# Patient Record
Sex: FEMALE | Race: WHITE | NOT HISPANIC OR LATINO
[De-identification: names, ages, dates, MRNs, and addresses within clinical notes are randomized per-mention and may not be internally consistent; named-entity substitution may affect disease eponyms.]

---

## 2021-08-21 ENCOUNTER — TRANSCRIPTION ENCOUNTER (OUTPATIENT)
Age: 86
End: 2021-08-21

## 2021-08-21 ENCOUNTER — INPATIENT (INPATIENT)
Facility: HOSPITAL | Age: 86
LOS: 4 days | Discharge: NURSING, REHAB OR LONG-TERM CARE | DRG: 482 | End: 2021-08-26
Attending: ORTHOPAEDIC SURGERY | Admitting: ORTHOPAEDIC SURGERY
Payer: COMMERCIAL

## 2021-08-21 VITALS
TEMPERATURE: 98 F | DIASTOLIC BLOOD PRESSURE: 96 MMHG | OXYGEN SATURATION: 96 % | HEART RATE: 75 BPM | HEIGHT: 60 IN | WEIGHT: 145.06 LBS | RESPIRATION RATE: 20 BRPM | SYSTOLIC BLOOD PRESSURE: 213 MMHG

## 2021-08-21 PROCEDURE — 99285 EMERGENCY DEPT VISIT HI MDM: CPT | Mod: GC

## 2021-08-22 DIAGNOSIS — R52 PAIN, UNSPECIFIED: ICD-10-CM

## 2021-08-22 DIAGNOSIS — E78.5 HYPERLIPIDEMIA, UNSPECIFIED: ICD-10-CM

## 2021-08-22 DIAGNOSIS — S72.009A FRACTURE OF UNSPECIFIED PART OF NECK OF UNSPECIFIED FEMUR, INITIAL ENCOUNTER FOR CLOSED FRACTURE: ICD-10-CM

## 2021-08-22 DIAGNOSIS — I10 ESSENTIAL (PRIMARY) HYPERTENSION: ICD-10-CM

## 2021-08-22 LAB
ALBUMIN SERPL ELPH-MCNC: 4.3 G/DL — SIGNIFICANT CHANGE UP (ref 3.3–5)
ALP SERPL-CCNC: 82 U/L — SIGNIFICANT CHANGE UP (ref 40–120)
ALT FLD-CCNC: 25 U/L — SIGNIFICANT CHANGE UP (ref 10–45)
ANION GAP SERPL CALC-SCNC: 12 MMOL/L — SIGNIFICANT CHANGE UP (ref 5–17)
ANION GAP SERPL CALC-SCNC: 12 MMOL/L — SIGNIFICANT CHANGE UP (ref 5–17)
APTT BLD: 26.2 SEC — LOW (ref 27.5–35.5)
AST SERPL-CCNC: 19 U/L — SIGNIFICANT CHANGE UP (ref 10–40)
BASOPHILS # BLD AUTO: 0.05 K/UL — SIGNIFICANT CHANGE UP (ref 0–0.2)
BASOPHILS # BLD AUTO: 0.05 K/UL — SIGNIFICANT CHANGE UP (ref 0–0.2)
BASOPHILS NFR BLD AUTO: 0.3 % — SIGNIFICANT CHANGE UP (ref 0–2)
BASOPHILS NFR BLD AUTO: 0.3 % — SIGNIFICANT CHANGE UP (ref 0–2)
BILIRUB SERPL-MCNC: 0.2 MG/DL — SIGNIFICANT CHANGE UP (ref 0.2–1.2)
BLD GP AB SCN SERPL QL: NEGATIVE — SIGNIFICANT CHANGE UP
BUN SERPL-MCNC: 21 MG/DL — SIGNIFICANT CHANGE UP (ref 7–23)
BUN SERPL-MCNC: 30 MG/DL — HIGH (ref 7–23)
CALCIUM SERPL-MCNC: 10.1 MG/DL — SIGNIFICANT CHANGE UP (ref 8.4–10.5)
CALCIUM SERPL-MCNC: 9.2 MG/DL — SIGNIFICANT CHANGE UP (ref 8.4–10.5)
CHLORIDE SERPL-SCNC: 105 MMOL/L — SIGNIFICANT CHANGE UP (ref 96–108)
CHLORIDE SERPL-SCNC: 106 MMOL/L — SIGNIFICANT CHANGE UP (ref 96–108)
CO2 SERPL-SCNC: 22 MMOL/L — SIGNIFICANT CHANGE UP (ref 22–31)
CO2 SERPL-SCNC: 23 MMOL/L — SIGNIFICANT CHANGE UP (ref 22–31)
CREAT SERPL-MCNC: 0.61 MG/DL — SIGNIFICANT CHANGE UP (ref 0.5–1.3)
CREAT SERPL-MCNC: 0.67 MG/DL — SIGNIFICANT CHANGE UP (ref 0.5–1.3)
EOSINOPHIL # BLD AUTO: 0.01 K/UL — SIGNIFICANT CHANGE UP (ref 0–0.5)
EOSINOPHIL # BLD AUTO: 0.07 K/UL — SIGNIFICANT CHANGE UP (ref 0–0.5)
EOSINOPHIL NFR BLD AUTO: 0.1 % — SIGNIFICANT CHANGE UP (ref 0–6)
EOSINOPHIL NFR BLD AUTO: 0.4 % — SIGNIFICANT CHANGE UP (ref 0–6)
GLUCOSE SERPL-MCNC: 159 MG/DL — HIGH (ref 70–99)
GLUCOSE SERPL-MCNC: 197 MG/DL — HIGH (ref 70–99)
HCT VFR BLD CALC: 38.8 % — SIGNIFICANT CHANGE UP (ref 34.5–45)
HCT VFR BLD CALC: 42.7 % — SIGNIFICANT CHANGE UP (ref 34.5–45)
HGB BLD-MCNC: 12.7 G/DL — SIGNIFICANT CHANGE UP (ref 11.5–15.5)
HGB BLD-MCNC: 14.2 G/DL — SIGNIFICANT CHANGE UP (ref 11.5–15.5)
IMM GRANULOCYTES NFR BLD AUTO: 0.6 % — SIGNIFICANT CHANGE UP (ref 0–1.5)
IMM GRANULOCYTES NFR BLD AUTO: 0.6 % — SIGNIFICANT CHANGE UP (ref 0–1.5)
INR BLD: 0.94 RATIO — SIGNIFICANT CHANGE UP (ref 0.88–1.16)
LYMPHOCYTES # BLD AUTO: 1.31 K/UL — SIGNIFICANT CHANGE UP (ref 1–3.3)
LYMPHOCYTES # BLD AUTO: 1.86 K/UL — SIGNIFICANT CHANGE UP (ref 1–3.3)
LYMPHOCYTES # BLD AUTO: 11.7 % — LOW (ref 13–44)
LYMPHOCYTES # BLD AUTO: 7.6 % — LOW (ref 13–44)
MCHC RBC-ENTMCNC: 30.3 PG — SIGNIFICANT CHANGE UP (ref 27–34)
MCHC RBC-ENTMCNC: 30.5 PG — SIGNIFICANT CHANGE UP (ref 27–34)
MCHC RBC-ENTMCNC: 32.7 GM/DL — SIGNIFICANT CHANGE UP (ref 32–36)
MCHC RBC-ENTMCNC: 33.3 GM/DL — SIGNIFICANT CHANGE UP (ref 32–36)
MCV RBC AUTO: 91.2 FL — SIGNIFICANT CHANGE UP (ref 80–100)
MCV RBC AUTO: 93.3 FL — SIGNIFICANT CHANGE UP (ref 80–100)
MONOCYTES # BLD AUTO: 0.56 K/UL — SIGNIFICANT CHANGE UP (ref 0–0.9)
MONOCYTES # BLD AUTO: 0.82 K/UL — SIGNIFICANT CHANGE UP (ref 0–0.9)
MONOCYTES NFR BLD AUTO: 3.2 % — SIGNIFICANT CHANGE UP (ref 2–14)
MONOCYTES NFR BLD AUTO: 5.2 % — SIGNIFICANT CHANGE UP (ref 2–14)
NEUTROPHILS # BLD AUTO: 13.01 K/UL — HIGH (ref 1.8–7.4)
NEUTROPHILS # BLD AUTO: 15.29 K/UL — HIGH (ref 1.8–7.4)
NEUTROPHILS NFR BLD AUTO: 81.8 % — HIGH (ref 43–77)
NEUTROPHILS NFR BLD AUTO: 88.2 % — HIGH (ref 43–77)
NRBC # BLD: 0 /100 WBCS — SIGNIFICANT CHANGE UP (ref 0–0)
NRBC # BLD: 0 /100 WBCS — SIGNIFICANT CHANGE UP (ref 0–0)
PLATELET # BLD AUTO: 229 K/UL — SIGNIFICANT CHANGE UP (ref 150–400)
PLATELET # BLD AUTO: 245 K/UL — SIGNIFICANT CHANGE UP (ref 150–400)
POTASSIUM SERPL-MCNC: 3.9 MMOL/L — SIGNIFICANT CHANGE UP (ref 3.5–5.3)
POTASSIUM SERPL-MCNC: 4.3 MMOL/L — SIGNIFICANT CHANGE UP (ref 3.5–5.3)
POTASSIUM SERPL-SCNC: 3.9 MMOL/L — SIGNIFICANT CHANGE UP (ref 3.5–5.3)
POTASSIUM SERPL-SCNC: 4.3 MMOL/L — SIGNIFICANT CHANGE UP (ref 3.5–5.3)
PROT SERPL-MCNC: 7.1 G/DL — SIGNIFICANT CHANGE UP (ref 6–8.3)
PROTHROM AB SERPL-ACNC: 11.3 SEC — SIGNIFICANT CHANGE UP (ref 10.6–13.6)
RBC # BLD: 4.16 M/UL — SIGNIFICANT CHANGE UP (ref 3.8–5.2)
RBC # BLD: 4.68 M/UL — SIGNIFICANT CHANGE UP (ref 3.8–5.2)
RBC # FLD: 13.3 % — SIGNIFICANT CHANGE UP (ref 10.3–14.5)
RBC # FLD: 13.6 % — SIGNIFICANT CHANGE UP (ref 10.3–14.5)
RH IG SCN BLD-IMP: POSITIVE — SIGNIFICANT CHANGE UP
SARS-COV-2 RNA SPEC QL NAA+PROBE: SIGNIFICANT CHANGE UP
SODIUM SERPL-SCNC: 140 MMOL/L — SIGNIFICANT CHANGE UP (ref 135–145)
SODIUM SERPL-SCNC: 140 MMOL/L — SIGNIFICANT CHANGE UP (ref 135–145)
WBC # BLD: 15.9 K/UL — HIGH (ref 3.8–10.5)
WBC # BLD: 17.33 K/UL — HIGH (ref 3.8–10.5)
WBC # FLD AUTO: 15.9 K/UL — HIGH (ref 3.8–10.5)
WBC # FLD AUTO: 17.33 K/UL — HIGH (ref 3.8–10.5)

## 2021-08-22 PROCEDURE — 73501 X-RAY EXAM HIP UNI 1 VIEW: CPT | Mod: 26,LT

## 2021-08-22 PROCEDURE — 93010 ELECTROCARDIOGRAM REPORT: CPT

## 2021-08-22 PROCEDURE — 71045 X-RAY EXAM CHEST 1 VIEW: CPT | Mod: 26

## 2021-08-22 PROCEDURE — 73552 X-RAY EXAM OF FEMUR 2/>: CPT | Mod: 26,LT,77

## 2021-08-22 PROCEDURE — 73502 X-RAY EXAM HIP UNI 2-3 VIEWS: CPT | Mod: 26,LT

## 2021-08-22 PROCEDURE — 73552 X-RAY EXAM OF FEMUR 2/>: CPT | Mod: 26,LT

## 2021-08-22 RX ORDER — OXYCODONE HYDROCHLORIDE 5 MG/1
5 TABLET ORAL EVERY 4 HOURS
Refills: 0 | Status: DISCONTINUED | OUTPATIENT
Start: 2021-08-22 | End: 2021-08-22

## 2021-08-22 RX ORDER — FAMOTIDINE 10 MG/ML
20 INJECTION INTRAVENOUS ONCE
Refills: 0 | Status: COMPLETED | OUTPATIENT
Start: 2021-08-22 | End: 2021-08-22

## 2021-08-22 RX ORDER — SODIUM CHLORIDE 9 MG/ML
1000 INJECTION INTRAMUSCULAR; INTRAVENOUS; SUBCUTANEOUS
Refills: 0 | Status: DISCONTINUED | OUTPATIENT
Start: 2021-08-22 | End: 2021-08-23

## 2021-08-22 RX ORDER — ACETAMINOPHEN 500 MG
975 TABLET ORAL EVERY 8 HOURS
Refills: 0 | Status: DISCONTINUED | OUTPATIENT
Start: 2021-08-22 | End: 2021-08-26

## 2021-08-22 RX ORDER — TRAMADOL HYDROCHLORIDE 50 MG/1
25 TABLET ORAL EVERY 4 HOURS
Refills: 0 | Status: DISCONTINUED | OUTPATIENT
Start: 2021-08-22 | End: 2021-08-26

## 2021-08-22 RX ORDER — CEFAZOLIN SODIUM 1 G
2000 VIAL (EA) INJECTION EVERY 8 HOURS
Refills: 0 | Status: COMPLETED | OUTPATIENT
Start: 2021-08-22 | End: 2021-08-23

## 2021-08-22 RX ORDER — FOLIC ACID 0.8 MG
1 TABLET ORAL DAILY
Refills: 0 | Status: DISCONTINUED | OUTPATIENT
Start: 2021-08-22 | End: 2021-08-26

## 2021-08-22 RX ORDER — OXYCODONE HYDROCHLORIDE 5 MG/1
2.5 TABLET ORAL EVERY 4 HOURS
Refills: 0 | Status: DISCONTINUED | OUTPATIENT
Start: 2021-08-22 | End: 2021-08-22

## 2021-08-22 RX ORDER — ONDANSETRON 8 MG/1
4 TABLET, FILM COATED ORAL EVERY 6 HOURS
Refills: 0 | Status: DISCONTINUED | OUTPATIENT
Start: 2021-08-22 | End: 2021-08-22

## 2021-08-22 RX ORDER — SENNA PLUS 8.6 MG/1
2 TABLET ORAL AT BEDTIME
Refills: 0 | Status: DISCONTINUED | OUTPATIENT
Start: 2021-08-22 | End: 2021-08-22

## 2021-08-22 RX ORDER — ROSUVASTATIN CALCIUM 5 MG/1
1 TABLET ORAL
Qty: 0 | Refills: 0 | DISCHARGE

## 2021-08-22 RX ORDER — BENZOCAINE AND MENTHOL 5; 1 G/100ML; G/100ML
1 LIQUID ORAL
Refills: 0 | Status: DISCONTINUED | OUTPATIENT
Start: 2021-08-22 | End: 2021-08-26

## 2021-08-22 RX ORDER — ACETAMINOPHEN 500 MG
625 TABLET ORAL EVERY 6 HOURS
Refills: 0 | Status: DISCONTINUED | OUTPATIENT
Start: 2021-08-22 | End: 2021-08-22

## 2021-08-22 RX ORDER — OXYCODONE HYDROCHLORIDE 5 MG/1
2.5 TABLET ORAL EVERY 4 HOURS
Refills: 0 | Status: DISCONTINUED | OUTPATIENT
Start: 2021-08-22 | End: 2021-08-26

## 2021-08-22 RX ORDER — ACETAMINOPHEN 500 MG
1000 TABLET ORAL ONCE
Refills: 0 | Status: COMPLETED | OUTPATIENT
Start: 2021-08-22 | End: 2021-08-22

## 2021-08-22 RX ORDER — AMLODIPINE BESYLATE 2.5 MG/1
2.5 TABLET ORAL ONCE
Refills: 0 | Status: COMPLETED | OUTPATIENT
Start: 2021-08-22 | End: 2021-08-22

## 2021-08-22 RX ORDER — MORPHINE SULFATE 50 MG/1
2 CAPSULE, EXTENDED RELEASE ORAL ONCE
Refills: 0 | Status: DISCONTINUED | OUTPATIENT
Start: 2021-08-22 | End: 2021-08-22

## 2021-08-22 RX ORDER — MAGNESIUM HYDROXIDE 400 MG/1
30 TABLET, CHEWABLE ORAL DAILY
Refills: 0 | Status: DISCONTINUED | OUTPATIENT
Start: 2021-08-22 | End: 2021-08-22

## 2021-08-22 RX ORDER — ATORVASTATIN CALCIUM 80 MG/1
20 TABLET, FILM COATED ORAL AT BEDTIME
Refills: 0 | Status: DISCONTINUED | OUTPATIENT
Start: 2021-08-22 | End: 2021-08-24

## 2021-08-22 RX ORDER — SODIUM CHLORIDE 9 MG/ML
1000 INJECTION INTRAMUSCULAR; INTRAVENOUS; SUBCUTANEOUS
Refills: 0 | Status: DISCONTINUED | OUTPATIENT
Start: 2021-08-22 | End: 2021-08-22

## 2021-08-22 RX ORDER — MAGNESIUM HYDROXIDE 400 MG/1
30 TABLET, CHEWABLE ORAL DAILY
Refills: 0 | Status: DISCONTINUED | OUTPATIENT
Start: 2021-08-22 | End: 2021-08-26

## 2021-08-22 RX ORDER — ONDANSETRON 8 MG/1
4 TABLET, FILM COATED ORAL ONCE
Refills: 0 | Status: COMPLETED | OUTPATIENT
Start: 2021-08-22 | End: 2021-08-22

## 2021-08-22 RX ORDER — ATORVASTATIN CALCIUM 80 MG/1
20 TABLET, FILM COATED ORAL AT BEDTIME
Refills: 0 | Status: DISCONTINUED | OUTPATIENT
Start: 2021-08-22 | End: 2021-08-22

## 2021-08-22 RX ORDER — RIVAROXABAN 15 MG-20MG
10 KIT ORAL DAILY
Refills: 0 | Status: DISCONTINUED | OUTPATIENT
Start: 2021-08-23 | End: 2021-08-26

## 2021-08-22 RX ORDER — FENTANYL CITRATE 50 UG/ML
25 INJECTION INTRAVENOUS
Refills: 0 | Status: DISCONTINUED | OUTPATIENT
Start: 2021-08-22 | End: 2021-08-22

## 2021-08-22 RX ADMIN — Medication 400 MILLIGRAM(S): at 13:35

## 2021-08-22 RX ADMIN — ONDANSETRON 4 MILLIGRAM(S): 8 TABLET, FILM COATED ORAL at 17:19

## 2021-08-22 RX ADMIN — Medication 975 MILLIGRAM(S): at 23:20

## 2021-08-22 RX ADMIN — Medication 1000 MILLIGRAM(S): at 13:50

## 2021-08-22 RX ADMIN — Medication 400 MILLIGRAM(S): at 06:24

## 2021-08-22 RX ADMIN — Medication 1000 MILLIGRAM(S): at 06:54

## 2021-08-22 RX ADMIN — AMLODIPINE BESYLATE 2.5 MILLIGRAM(S): 2.5 TABLET ORAL at 12:53

## 2021-08-22 RX ADMIN — OXYCODONE HYDROCHLORIDE 5 MILLIGRAM(S): 5 TABLET ORAL at 06:24

## 2021-08-22 RX ADMIN — BENZOCAINE AND MENTHOL 1 LOZENGE: 5; 1 LIQUID ORAL at 22:21

## 2021-08-22 RX ADMIN — MORPHINE SULFATE 2 MILLIGRAM(S): 50 CAPSULE, EXTENDED RELEASE ORAL at 03:41

## 2021-08-22 RX ADMIN — MORPHINE SULFATE 2 MILLIGRAM(S): 50 CAPSULE, EXTENDED RELEASE ORAL at 01:26

## 2021-08-22 RX ADMIN — FAMOTIDINE 20 MILLIGRAM(S): 10 INJECTION INTRAVENOUS at 08:51

## 2021-08-22 RX ADMIN — OXYCODONE HYDROCHLORIDE 5 MILLIGRAM(S): 5 TABLET ORAL at 07:24

## 2021-08-22 RX ADMIN — Medication 975 MILLIGRAM(S): at 22:18

## 2021-08-22 RX ADMIN — Medication 100 MILLIGRAM(S): at 22:17

## 2021-08-22 NOTE — ED PROVIDER NOTE - CARE PLAN
1 Principal Discharge DX:	Hip fracture   Principal Discharge DX:	Hip fracture  Goal:	acute, intertrochanteric fracture of the left hip with minimal apex anterior angulation

## 2021-08-22 NOTE — CONSULT NOTE ADULT - PROBLEM SELECTOR RECOMMENDATION 2
Patient has been treating HTN with diet  will continue to monitor BP and adjust meds asneeded  Low sodium diet

## 2021-08-22 NOTE — CONSULT NOTE ADULT - PROBLEM SELECTOR RECOMMENDATION 9
Patient scheduled for an Open reduction and internal fixation of the left hip  No contraindication to scheduled procedure  NPO for procedure   DVT and GI prophylaxis

## 2021-08-22 NOTE — PROGRESS NOTE ADULT - SUBJECTIVE AND OBJECTIVE BOX
Post op Day [ ]  Hospital Day [1 ]     Patient resting without complaints.    No acute event O/N  Waiting for surgery  No chest pain, SOB, N/V.    T(C): 36.9 (08-22-21 @ 06:32), Max: 37.1 (08-22-21 @ 01:01)  HR: 78 (08-22-21 @ 06:32) (75 - 90)  BP: 189/85 (08-22-21 @ 06:32) (162/72 - 213/96)  RR: 18 (08-22-21 @ 06:32) (18 - 20)  SpO2: 98% (08-22-21 @ 06:32) (95% - 98%)  Wt(kg): --    Exam:   Alert / oriented / conversive-  EXT:   LLE         (+) shortening / Ext Rotation           digits slightly cool           n/v/i  No significant changes            1+ DP pulses                                                 14.2   15.90 )-----------( 245      ( 22 Aug 2021 01:44 )             42.7    08-22    140  |  105  |  30<H>  ----------------------------<  197<H>  3.9   |  23  |  0.67    Ca    10.1      22 Aug 2021 01:44    TPro  7.1  /  Alb  4.3  /  TBili  0.2  /  DBili  x   /  AST  19  /  ALT  25  /  AlkPhos  82  08-22  PT/INR - ( 22 Aug 2021 01:44 )   PT: 11.3 sec;   INR: 0.94 ratio         PTT - ( 22 Aug 2021 01:44 )  PTT:26.2 sec

## 2021-08-22 NOTE — BRIEF OPERATIVE NOTE - NSICDXBRIEFPOSTOP_GEN_ALL_CORE_FT
POST-OP DIAGNOSIS:  Closed intertrochanteric fracture of femur 22-Aug-2021 16:32:19  Mitchell Iglesias

## 2021-08-22 NOTE — H&P ADULT - NSHPPHYSICALEXAM_GEN_ALL_CORE
T(C): 36.8 (08-22-21 @ 02:14), Max: 37.1 (08-22-21 @ 01:01)  HR: 81 (08-22-21 @ 03:00) (75 - 90)  BP: 172/74 (08-22-21 @ 03:00) (172/74 - 213/96)  RR: 18 (08-22-21 @ 03:00) (18 - 20)  SpO2: 96% (08-22-21 @ 03:00) (95% - 98%)  Wt(kg): --    PE   LLE:  Skin intact; No ecchymosis/soft tissue swelling  Compartments soft; + TTP about hip. No TTP to knee/leg/ankle/foot   ROM limited 2/2 pain   Unable to SLR; + Log Roll/Heel Strike  Motor intact GS/TA/FHL/EHL  SILT L2-S1  DP/PT pulses 2+    RLE/BUE:   No bony TTP; Good ROM w/o pain; Exam Unremarkable

## 2021-08-22 NOTE — ED PROVIDER NOTE - PHYSICAL EXAMINATION
GENERAL: Awake, alert, NAD  HEENT: NC/AT, moist mucous membranes  LUNGS: CTAB, no wheezes or crackles   CARDIAC: RRR, no m/r/g  ABDOMEN: Soft, normal BS, non tender, non distended, no rebound, no guarding  EXT: +tenderness overlying L lateral thigh, unable to move LLE, leg mildly externally rotated  NEURO: A&Ox3. Moving all extremities.  SKIN: Warm and dry. No rash.  PSYCH: Normal affect.

## 2021-08-22 NOTE — ED PROVIDER NOTE - ATTENDING CONTRIBUTION TO CARE
Attending MD Swift: I personally have seen and examined this patient.  Resident note reviewed and agree on plan of care and except where noted.  See below for details.     seen in [facility]  Accompanied by Jerry Kidd [phone] (requested to be the Emergency Contact)    89F with PMH/PSH including HTN on OTC "herbal medication", HLD on Rosuvastatin, glaucoma on Combigan presents to the ED with L hip pain after fall while dancing at a wedding.  Reports that she tripped on her own feet and landed on her L thigh.  Reports was unable to ambulate after fall.  Denies any other pain or injury including denies chest pain, shortness of breath, palpitations. Denies abdominal pain, nausea, vomiting, diarrhea, bloody or black stools. Denies dysuria, hematuria, change in urinary habits including frequency, urgency. Denies recent illness.  A ten (10) point review of systems was negative other than as stated in the HPI or elsewhere in the chart.     Exam:   General: NAD  HENT: head NCAT, airway patent  Eyes: PERRL  Lungs: lungs CTAB with good inspiratory effort, no wheezing, no rhonchi, no rales  Cardiac: +S1S2, no m/r/g  GI: abdomen soft with +BS, NT, ND  : no CVAT  MSK: FROM at neck, +tenderness to the L hip/thigh, not ranging LLE secondary to pain  Neuro: moving all extremities spontaneously  Psych: normal mood and affect    A/P: 89F with suspected L hip bony injury, will obtain XR    TO BE COMPLETED Attending MD Swift: I personally have seen and examined this patient.  Resident note reviewed and agree on plan of care and except where noted.  See below for details.     seen in [place] 11  Accompanied by Jerry Kidd [phone] (requested to be the Emergency Contact)    89F with PMH/PSH including HTN on OTC "herbal medication", HLD on Rosuvastatin, glaucoma on Combigan presents to the ED with L hip pain after fall while dancing at a wedding.  Reports that she tripped on her own feet and landed on her L thigh.  Reports was unable to ambulate after fall.  Denies any other pain or injury including denies chest pain, shortness of breath, palpitations. Denies abdominal pain, nausea, vomiting, diarrhea, bloody or black stools. Denies dysuria, hematuria, change in urinary habits including frequency, urgency. Denies recent illness.  A ten (10) point review of systems was negative other than as stated in the HPI or elsewhere in the chart.     Exam:   General: NAD  HENT: head NCAT, airway patent  Eyes: PERRL  Lungs: lungs CTAB with good inspiratory effort, no wheezing, no rhonchi, no rales  Cardiac: +S1S2, no m/r/g  GI: abdomen soft with +BS, NT, ND  : no CVAT  MSK: FROM at neck, +tenderness to the L hip/thigh, not ranging LLE secondary to pain, LLE foreshortened and externally rotated, +2 DPs  Neuro: moving all extremities spontaneously except for LLE, limited secondary to pain, sensory grossly intact  Psych: normal mood and affect    A/P: 89F with suspected L hip bony injury, will obtain XR L hip w pelvis, L femur, preop labs, CXR, EKG, pain control

## 2021-08-22 NOTE — ED PROVIDER NOTE - PROGRESS NOTE DETAILS
XR with L hip fracture, patient evaluated by ortho, will admit to their service. Papito Cody, DO PGY3

## 2021-08-22 NOTE — ED PROVIDER NOTE - OBJECTIVE STATEMENT
89 y/o F with hx of HTN, HLD, not on blood thinners, presenting to the ED s/p fall c/o left thigh pain. Patient was dancing when she tripped over her feet and landed on her left thigh. She was unable to get off the floor or walk after the injury. Currently reports left thigh pain. No chest pain, shortness of breath, nausea, vomiting. No head injury or LOC.

## 2021-08-22 NOTE — PRE-ANESTHESIA EVALUATION ADULT - NSANTHOSAYNRD_GEN_A_CORE
No. ESAU screening performed.  STOP BANG Legend: 0-2 = LOW Risk; 3-4 = INTERMEDIATE Risk; 5-8 = HIGH Risk

## 2021-08-22 NOTE — ED PROVIDER NOTE - CLINICAL SUMMARY MEDICAL DECISION MAKING FREE TEXT BOX
89 y/o F with PMH HTN, HLD presenting to the ED s/p slip and fall c/o left thigh pain. Patient hypertensive, vitals otherwise stable. Exam with +left lateral hip pain, patient unable to move leg off stretcher. Will get preop labs, XR L hip to evaluate for fx. Papito Cody, DO PGY3

## 2021-08-22 NOTE — H&P ADULT - HISTORY OF PRESENT ILLNESS
89y Female PMH HLD presents s/p mechanical fall c/o severe L hip pain and inability to ambulate. States she was dancing at a wedding yesterday evening when she lost her balance, falling on her left hip. Patient denies headstrike or LOC. Patient denies radiation of pain. Patient denies numbness/tingling/burning in the LLE. No other bone/joint complaints. Patient is a community ambulator at baseline without assistive devices.

## 2021-08-22 NOTE — CONSULT NOTE ADULT - SUBJECTIVE AND OBJECTIVE BOX
89 y/o F with hx of HTN, HLD, not on blood thinners, presenting to the ED s/p fall c/o left thigh pain. Patient was dancing when she tripped over her feet and landed on her left thigh. She was unable to get off the floor or walk after the injury. Currently reports left thigh pain. No chest pain, shortness of breath, nausea, vomiting. No head injury or LOC. Patient was brought to [facility] where she was found to have a left hip fx. Patient is scheduled for an Open reduction and internal fixation of the left hip. Patient seen ow resting comfortably.       PAST MEDICAL & SURGICAL HISTORY:  HTN    Glaucoma    HDL        MEDICATIONS  (STANDING):  atorvastatin 20 milliGRAM(s) Oral at bedtime  famotidine Injectable 20 milliGRAM(s) IV Push once  senna 2 Tablet(s) Oral at bedtime  sodium chloride 0.9%. 1000 milliLiter(s) (85 mL/Hr) IV Continuous <Continuous>    MEDICATIONS  (PRN):  acetaminophen  IVPB .. 1000 milliGRAM(s) IV Intermittent once PRN Severe Pain (7 - 10)  magnesium hydroxide Suspension 30 milliLiter(s) Oral daily PRN Constipation  ondansetron Injectable 4 milliGRAM(s) IV Push every 6 hours PRN Nausea and/or Vomiting  oxyCODONE    IR 2.5 milliGRAM(s) Oral every 4 hours PRN Moderate Pain (4 - 6)  oxyCODONE    IR 5 milliGRAM(s) Oral every 4 hours PRN Severe Pain (7 - 10)      ROS  CONSTITUTIONAL: No weakness, fevers or chills  EYES/ENT: No visual changes;  No vertigo or throat pain   NECK: No pain or stiffness  RESPIRATORY: No cough, wheezing, hemoptysis; No shortness of breath  CARDIOVASCULAR: No chest pain or palpitations  GASTROINTESTINAL: No abdominal or epigastric pain. No nausea, vomiting, or hematemesis; No diarrhea or constipation. No melena or hematochezia.  GENITOURINARY: No dysuria, frequency or hematuria  NEUROLOGICAL: No numbness or weakness  SKIN: No itching, burning, rashes, or lesions   MUSCULOSKELETAL: Left hip pain    INTERVAL HPI/OVERNIGHT EVENTS:  T(C): 36.9 (08-22-21 @ 06:32), Max: 37.1 (08-22-21 @ 01:01)  HR: 78 (08-22-21 @ 06:32) (75 - 90)  BP: 189/85 (08-22-21 @ 06:32) (162/72 - 213/96)  RR: 18 (08-22-21 @ 06:32) (18 - 20)  SpO2: 98% (08-22-21 @ 06:32) (95% - 98%)  Wt(kg): --  I&O's Summary    21 Aug 2021 07:01  -  22 Aug 2021 07:00  --------------------------------------------------------  IN: 350 mL / OUT: 0 mL / NET: 350 mL        PHYSICAL EXAM:  GENERAL: NAD, well-groomed, well-developed  HEAD:  Atraumatic, Normocephalic  EYES: EOMI, PERRLA, conjunctiva and sclera clear  ENMT: No tonsillar erythema, exudates, or enlargement; Moist mucous membranes, Good dentition, No lesions  NECK: Supple, No JVD, Normal thyroid  NERVOUS SYSTEM:  Alert & Oriented X3, Good concentration; Motor Strength 5/5 B/L upper and lower extremities; DTRs 2+ intact and symmetric  CHEST/LUNG: Clear to percussion bilaterally; No rales, rhonchi, wheezing, or rubs  HEART: Regular rate and rhythm; No murmurs, rubs, or gallops  ABDOMEN: Soft, Nontender, Nondistended; Bowel sounds present  EXTREMITIES:  2+ Peripheral Pulses, No clubbing, cyanosis, or edema  LYMPH: No lymphadenopathy noted  SKIN: No rashes or lesions        LABS:                        14.2   15.90 )-----------( 245      ( 22 Aug 2021 01:44 )             42.7     08-22    140  |  105  |  30<H>  ----------------------------<  197<H>  3.9   |  23  |  0.67    Ca    10.1      22 Aug 2021 01:44    TPro  7.1  /  Alb  4.3  /  TBili  0.2  /  DBili  x   /  AST  19  /  ALT  25  /  AlkPhos  82  08-22    PT/INR - ( 22 Aug 2021 01:44 )   PT: 11.3 sec;   INR: 0.94 ratio         PTT - ( 22 Aug 2021 01:44 )  PTT:26.2 sec    EKG   Sinus rhythm with PVCs @ 71                Radiology reports:

## 2021-08-22 NOTE — ED ADULT NURSE NOTE - OBJECTIVE STATEMENT
Pt is a 89 F PMH HTN, HLD, denies AC p/w L thigh pain s/p mechanical fall. Patient was dancing when she tripped over her feet and landed on her left thigh. She was unable to get off the floor or walk after the injury. Currently reports left thigh pain. No chest pain, shortness of breath, nausea, vomiting, head injury or LOC. A&Ox4, SHAIKH, lungs clear, distal pulses intact, abdomen soft, skin intact. Side rails up for safety, call bell and personal items within reach, instructed to call for assistance, verbalizes understanding. Will continue to monitor.

## 2021-08-22 NOTE — PATIENT PROFILE ADULT - NSASFALLWHENOCCURRED_GEN_A_NUR
Quality 226: Preventive Care And Screening: Tobacco Use: Screening And Cessation Intervention: Patient screened for tobacco use and is an ex/non-smoker
Detail Level: Detailed
Quality 431: Preventive Care And Screening: Unhealthy Alcohol Use - Screening: Patient screened for unhealthy alcohol use using a single question and scores less than 2 times per year
Quality 130: Documentation Of Current Medications In The Medical Record: Current Medications Documented
last six months

## 2021-08-22 NOTE — H&P ADULT - ASSESSMENT
89y Female with L intertroch fracture  - Pain control  - NPO/IVF  - Primafit  - CBC/BMP/Coags/UA/T+S x2  - EKG/CXR  - Medical clearance  - Plan for OR for ORIF

## 2021-08-22 NOTE — CONSULT NOTE ADULT - ASSESSMENT
89 yo woman presents after a trip and fall with a left hip fx. Patient is scheduled for an Open reduction and internal fixation of the left hip. Patient seen resting comfortably

## 2021-08-22 NOTE — PROGRESS NOTE ADULT - SUBJECTIVE AND OBJECTIVE BOX
Orthopedics Post-op Check  POD 0  Patient seen and examined at bedside. Pain is controlled. Pt feeling well. No nausea or vomiting.    Vital Signs Last 24 Hrs  T(C): 36.8 (08-22-21 @ 18:30), Max: 37.1 (08-22-21 @ 01:01)  T(F): 98.3 (08-22-21 @ 18:30), Max: 98.7 (08-22-21 @ 01:01)  HR: 83 (08-22-21 @ 18:30) (67 - 92)  BP: 154/74 (08-22-21 @ 18:30) (130/74 - 213/96)  BP(mean): 101 (08-22-21 @ 18:02) (94 - 129)  RR: 16 (08-22-21 @ 18:30) (12 - 20)  SpO2: 98% (08-22-21 @ 18:30) (95% - 100%)                        12.7   17.33 )-----------( 229      ( 22 Aug 2021 16:59 )             38.8     22 Aug 2021 16:59    140    |  106    |  21     ----------------------------<  159    4.3     |  22     |  0.61     Ca    9.2        22 Aug 2021 16:59    TPro  7.1    /  Alb  4.3    /  TBili  0.2    /  DBili  x      /  AST  19     /  ALT  25     /  AlkPhos  82     22 Aug 2021 01:44    PT/INR - ( 22 Aug 2021 01:44 )   PT: 11.3 sec;   INR: 0.94 ratio         PTT - ( 22 Aug 2021 01:44 )  PTT:26.2 sec    Exam:  Gen: NAD, resting comfortably  LLE  Dressing c/d/i  +EHL/FHL/TA/GS  SILT L2-S1  +DP/PT 2+  Calf NTTP b/l  Compartments soft and compressible    A/P:  89yFemale Stable POD 0  s/p L IMN    -FU labs  -WBAT  -Pain control  -PT/OT  -Ppx ABX for 24 hours  -DVT PE ppx- hold until POD1, Xarelto tomorrow  -Incentive spirometry  -Dispo Planning

## 2021-08-22 NOTE — H&P ADULT - NSHPLABSRESULTS_GEN_ALL_CORE
14.2   15.90 )-----------( 245      ( 22 Aug 2021 01:44 )             42.7     08-22    140  |  105  |  30<H>  ----------------------------<  197<H>  3.9   |  23  |  0.67    Ca    10.1      22 Aug 2021 01:44    TPro  7.1  /  Alb  4.3  /  TBili  0.2  /  DBili  x   /  AST  19  /  ALT  25  /  AlkPhos  82  08-22    PT/INR - ( 22 Aug 2021 01:44 )   PT: 11.3 sec;   INR: 0.94 ratio         PTT - ( 22 Aug 2021 01:44 )  PTT:26.2 sec    Imaging:  XR demonstrating L femoral intertroch fracture

## 2021-08-23 LAB
24R-OH-CALCIDIOL SERPL-MCNC: 27.4 NG/ML — LOW (ref 30–80)
ALBUMIN SERPL ELPH-MCNC: 3.9 G/DL — SIGNIFICANT CHANGE UP (ref 3.3–5)
ANION GAP SERPL CALC-SCNC: 14 MMOL/L — SIGNIFICANT CHANGE UP (ref 5–17)
BUN SERPL-MCNC: 16 MG/DL — SIGNIFICANT CHANGE UP (ref 7–23)
CALCIUM SERPL-MCNC: 9.1 MG/DL — SIGNIFICANT CHANGE UP (ref 8.4–10.5)
CHLORIDE SERPL-SCNC: 106 MMOL/L — SIGNIFICANT CHANGE UP (ref 96–108)
CO2 SERPL-SCNC: 22 MMOL/L — SIGNIFICANT CHANGE UP (ref 22–31)
COVID-19 SPIKE DOMAIN AB INTERP: POSITIVE
COVID-19 SPIKE DOMAIN ANTIBODY RESULT: >250 U/ML — HIGH
CREAT SERPL-MCNC: 0.7 MG/DL — SIGNIFICANT CHANGE UP (ref 0.5–1.3)
GLUCOSE SERPL-MCNC: 144 MG/DL — HIGH (ref 70–99)
HCT VFR BLD CALC: 36.3 % — SIGNIFICANT CHANGE UP (ref 34.5–45)
HGB BLD-MCNC: 11.9 G/DL — SIGNIFICANT CHANGE UP (ref 11.5–15.5)
MCHC RBC-ENTMCNC: 30.5 PG — SIGNIFICANT CHANGE UP (ref 27–34)
MCHC RBC-ENTMCNC: 32.8 GM/DL — SIGNIFICANT CHANGE UP (ref 32–36)
MCV RBC AUTO: 93.1 FL — SIGNIFICANT CHANGE UP (ref 80–100)
NRBC # BLD: 0 /100 WBCS — SIGNIFICANT CHANGE UP (ref 0–0)
PLATELET # BLD AUTO: 216 K/UL — SIGNIFICANT CHANGE UP (ref 150–400)
POTASSIUM SERPL-MCNC: 4 MMOL/L — SIGNIFICANT CHANGE UP (ref 3.5–5.3)
POTASSIUM SERPL-SCNC: 4 MMOL/L — SIGNIFICANT CHANGE UP (ref 3.5–5.3)
RBC # BLD: 3.9 M/UL — SIGNIFICANT CHANGE UP (ref 3.8–5.2)
RBC # FLD: 14 % — SIGNIFICANT CHANGE UP (ref 10.3–14.5)
SARS-COV-2 IGG+IGM SERPL QL IA: >250 U/ML — HIGH
SARS-COV-2 IGG+IGM SERPL QL IA: POSITIVE
SODIUM SERPL-SCNC: 142 MMOL/L — SIGNIFICANT CHANGE UP (ref 135–145)
WBC # BLD: 14.45 K/UL — HIGH (ref 3.8–10.5)
WBC # FLD AUTO: 14.45 K/UL — HIGH (ref 3.8–10.5)

## 2021-08-23 RX ADMIN — RIVAROXABAN 10 MILLIGRAM(S): KIT at 12:28

## 2021-08-23 RX ADMIN — Medication 975 MILLIGRAM(S): at 22:30

## 2021-08-23 RX ADMIN — Medication 975 MILLIGRAM(S): at 06:20

## 2021-08-23 RX ADMIN — Medication 975 MILLIGRAM(S): at 13:54

## 2021-08-23 RX ADMIN — Medication 1 MILLIGRAM(S): at 12:29

## 2021-08-23 RX ADMIN — TRAMADOL HYDROCHLORIDE 25 MILLIGRAM(S): 50 TABLET ORAL at 22:30

## 2021-08-23 RX ADMIN — Medication 975 MILLIGRAM(S): at 05:25

## 2021-08-23 RX ADMIN — Medication 975 MILLIGRAM(S): at 21:23

## 2021-08-23 RX ADMIN — TRAMADOL HYDROCHLORIDE 25 MILLIGRAM(S): 50 TABLET ORAL at 12:59

## 2021-08-23 RX ADMIN — TRAMADOL HYDROCHLORIDE 25 MILLIGRAM(S): 50 TABLET ORAL at 21:23

## 2021-08-23 RX ADMIN — Medication 975 MILLIGRAM(S): at 14:24

## 2021-08-23 RX ADMIN — TRAMADOL HYDROCHLORIDE 25 MILLIGRAM(S): 50 TABLET ORAL at 12:29

## 2021-08-23 RX ADMIN — Medication 100 MILLIGRAM(S): at 06:19

## 2021-08-23 NOTE — PHYSICAL THERAPY INITIAL EVALUATION ADULT - ACTIVE RANGE OF MOTION EXAMINATION, REHAB EVAL
genoveva. upper extremity Active ROM was WNL (within normal limits)/Left UE Active ROM was WFL (within functional limits)

## 2021-08-23 NOTE — PHYSICAL THERAPY INITIAL EVALUATION ADULT - PERTINENT HX OF CURRENT PROBLEM, REHAB EVAL
89y Female PMH HLD presents s/p mechanical fall c/o severe L hip pain and inability to ambulate. States she was dancing at a wedding yesterday evening when she lost her balance, falling on her left hip. Patient is a community ambulator at baseline without assistive devices. s/p L IMN 8/22

## 2021-08-23 NOTE — PROGRESS NOTE ADULT - SUBJECTIVE AND OBJECTIVE BOX
89 y/o F with hx of HTN, HLD, not on blood thinners, presenting to the ED s/p fall c/o left thigh pain. Patient was dancing when she tripped over her feet and landed on her left thigh. She was unable to get off the floor or walk after the injury. Currently reports left thigh pain. No chest pain, shortness of breath, nausea, vomiting. No head injury or LOC. Patient was brought to [facility] where she was found to have a left hip fx. Patient is s/p an Open reduction and internal fixation of the left hip. Patient tolerated the procedure well and seen resting comfortably      MEDICATIONS  (STANDING):  acetaminophen   Tablet .. 975 milliGRAM(s) Oral every 8 hours  atorvastatin 20 milliGRAM(s) Oral at bedtime  folic acid 1 milliGRAM(s) Oral daily  rivaroxaban 10 milliGRAM(s) Oral daily  sodium chloride 0.9%. 1000 milliLiter(s) (75 mL/Hr) IV Continuous <Continuous>    MEDICATIONS  (PRN):  aluminum hydroxide/magnesium hydroxide/simethicone Suspension 30 milliLiter(s) Oral four times a day PRN Indigestion  benzocaine 15 mG/menthol 3.6 mG (Sugar-Free) Lozenge 1 Lozenge Oral every 2 hours PRN Sore Throat  magnesium hydroxide Suspension 30 milliLiter(s) Oral daily PRN Constipation  oxyCODONE    IR 2.5 milliGRAM(s) Oral every 4 hours PRN Severe Pain (7 - 10)  traMADol 25 milliGRAM(s) Oral every 4 hours PRN Moderate Pain (4 - 6)          VITALS:   T(C): 37.1 (08-23-21 @ 12:22), Max: 37.1 (08-22-21 @ 21:30)  HR: 82 (08-23-21 @ 12:22) (68 - 96)  BP: 167/85 (08-23-21 @ 13:04) (130/74 - 168/77)  RR: 18 (08-23-21 @ 12:22) (12 - 18)  SpO2: 94% (08-23-21 @ 12:22) (92% - 100%)  Wt(kg): --    PHYSICAL EXAM:  GENERAL: NAD, well-groomed, well-developed  HEAD:  Atraumatic, Normocephalic  EYES: EOMI, PERRLA, conjunctiva and sclera clear  ENMT: No tonsillar erythema, exudates, or enlargement; Moist mucous membranes, Good dentition, No lesions  NECK: Supple, No JVD, Normal thyroid  NERVOUS SYSTEM:  Alert & Oriented X3, Good concentration; Motor Strength 5/5 B/L upper and lower extremities; DTRs 2+ intact and symmetric  CHEST/LUNG: Clear to percussion bilaterally; No rales, rhonchi, wheezing, or rubs  HEART: Regular rate and rhythm; No murmurs, rubs, or gallops  ABDOMEN: Soft, Nontender, Nondistended; Bowel sounds present  EXTREMITIES:  2+ Peripheral Pulses, No clubbing, cyanosis, or edema  LYMPH: No lymphadenopathy noted  SKIN: No rashes or lesions      LABS:        CBC Full  -  ( 23 Aug 2021 07:07 )  WBC Count : 14.45 K/uL  RBC Count : 3.90 M/uL  Hemoglobin : 11.9 g/dL  Hematocrit : 36.3 %  Platelet Count - Automated : 216 K/uL  Mean Cell Volume : 93.1 fl  Mean Cell Hemoglobin : 30.5 pg  Mean Cell Hemoglobin Concentration : 32.8 gm/dL  Auto Neutrophil # : x  Auto Lymphocyte # : x  Auto Monocyte # : x  Auto Eosinophil # : x  Auto Basophil # : x  Auto Neutrophil % : x  Auto Lymphocyte % : x  Auto Monocyte % : x  Auto Eosinophil % : x  Auto Basophil % : x    08-23    142  |  106  |  16  ----------------------------<  144<H>  4.0   |  22  |  0.70    Ca    9.1      23 Aug 2021 07:52    TPro  x   /  Alb  3.9  /  TBili  x   /  DBili  x   /  AST  x   /  ALT  x   /  AlkPhos  x   08-23    LIVER FUNCTIONS - ( 23 Aug 2021 07:52 )  Alb: 3.9 g/dL / Pro: x     / ALK PHOS: x     / ALT: x     / AST: x     / GGT: x           PT/INR - ( 22 Aug 2021 01:44 )   PT: 11.3 sec;   INR: 0.94 ratio         PTT - ( 22 Aug 2021 01:44 )  PTT:26.2 sec    CAPILLARY BLOOD GLUCOSE          RADIOLOGY & ADDITIONAL TESTS:

## 2021-08-23 NOTE — PHYSICAL THERAPY INITIAL EVALUATION ADULT - ADDITIONAL COMMENTS
Pt lives alone in apt, 8 steps to enter with HR. PTA, pt was I with all ADLs and functional mobility withouse use of AD. Pt does not own any AD.

## 2021-08-23 NOTE — PHYSICAL THERAPY INITIAL EVALUATION ADULT - LEVEL OF INDEPENDENCE: GAIT, REHAB EVAL
contact guard/minimum assist (75% patients effort) Modified Advancement Flap Text: The defect edges were debeveled with a #15 scalpel blade.  Given the location of the defect, shape of the defect and the proximity to free margins a modified advancement flap was deemed most appropriate.  Using a sterile surgical marker, an appropriate advancement flap was drawn incorporating the defect and placing the expected incisions within the relaxed skin tension lines where possible.    The area thus outlined was incised deep to adipose tissue with a #15 scalpel blade.  The skin margins were undermined to an appropriate distance in all directions utilizing iris scissors.

## 2021-08-23 NOTE — PROGRESS NOTE ADULT - SUBJECTIVE AND OBJECTIVE BOX
89 y/o F s/p L IMN. POD 1. Patient seen and examined at bedside. Pain is controlled. Pt feeling well. No nausea or vomiting.    Vital Signs Last 24 Hrs  T(C): 37 (23 Aug 2021 04:01), Max: 37.1 (22 Aug 2021 21:30)  T(F): 98.6 (23 Aug 2021 04:01), Max: 98.8 (22 Aug 2021 21:30)  HR: 93 (23 Aug 2021 04:01) (67 - 96)  BP: 147/74 (23 Aug 2021 04:01) (130/74 - 189/85)  BP(mean): 101 (22 Aug 2021 18:02) (94 - 108)  RR: 16 (23 Aug 2021 04:01) (12 - 18)  SpO2: 93% (23 Aug 2021 04:01) (93% - 100%)    08-22    140  |  106  |  21  ----------------------------<  159<H>  4.3   |  22  |  0.61    Ca    9.2      22 Aug 2021 16:59    TPro  7.1  /  Alb  4.3  /  TBili  0.2  /  DBili  x   /  AST  19  /  ALT  25  /  AlkPhos  82  08-22    PT/INR - ( 22 Aug 2021 01:44 )   PT: 11.3 sec;   INR: 0.94 ratio    PTT - ( 22 Aug 2021 01:44 )  PTT:26.2 sec    Physical Exam:  Gen: NAD, resting comfortably  LLE:  Dressing c/d/i  +EHL/FHL/TA/GS  SILT L2-S1  +DP/PT 2+  Calf NTTP b/l  Compartments soft and compressible      A/P: 89y/o F Stable POD 1  s/p L IMN    -F/u postop labs  -WBAT  -Pain control  -PT/OT  -Ppx Abx for 24 hours  -DVT PE ppx, restart Xarelto  -Incentive spirometry  -Dispo Planning 89 y/o F s/p L IMN. POD 1. Patient seen and examined at bedside. Pain is controlled. Pt feeling well. No nausea or vomiting.    Vital Signs Last 24 Hrs  T(C): 37 (23 Aug 2021 04:01), Max: 37.1 (22 Aug 2021 21:30)  T(F): 98.6 (23 Aug 2021 04:01), Max: 98.8 (22 Aug 2021 21:30)  HR: 93 (23 Aug 2021 04:01) (67 - 96)  BP: 147/74 (23 Aug 2021 04:01) (130/74 - 189/85)  BP(mean): 101 (22 Aug 2021 18:02) (94 - 108)  RR: 16 (23 Aug 2021 04:01) (12 - 18)  SpO2: 93% (23 Aug 2021 04:01) (93% - 100%)    08-22    140  |  106  |  21  ----------------------------<  159<H>  4.3   |  22  |  0.61    Ca    9.2      22 Aug 2021 16:59    TPro  7.1  /  Alb  4.3  /  TBili  0.2  /  DBili  x   /  AST  19  /  ALT  25  /  AlkPhos  82  08-22    PT/INR - ( 22 Aug 2021 01:44 )   PT: 11.3 sec;   INR: 0.94 ratio    PTT - ( 22 Aug 2021 01:44 )  PTT:26.2 sec    Physical Exam:  Gen: NAD, resting comfortably  LLE:  Dressing c/d/i  +EHL/FHL/TA/GS  SILT L2-S1  +DP/PT 2+  Calf NTTP b/l  Compartments soft and compressible

## 2021-08-23 NOTE — OCCUPATIONAL THERAPY INITIAL EVALUATION ADULT - PERTINENT HX OF CURRENT PROBLEM, REHAB EVAL
89y Female PMH HLD presents s/p mechanical fall c/o severe L hip pain and inability to ambulate. States she was dancing at a wedding yesterday evening when she lost her balance, falling on her left hip. Patient is a community ambulator at baseline without assistive devices. s/p IMN 8/22

## 2021-08-23 NOTE — PHYSICAL THERAPY INITIAL EVALUATION ADULT - BALANCE DISTURBANCE, IDENTIFIED IMPAIRMENT CONTRIBUTE, REHAB EVAL
Patient arriving to the ED with reports of vomiting and sore throat from vomiting.  Patient reports that she received oxycodone yesterday for her right foot pain and she started vomiting after.     decreased strength

## 2021-08-24 ENCOUNTER — TRANSCRIPTION ENCOUNTER (OUTPATIENT)
Age: 86
End: 2021-08-24

## 2021-08-24 LAB
ANION GAP SERPL CALC-SCNC: 10 MMOL/L — SIGNIFICANT CHANGE UP (ref 5–17)
BUN SERPL-MCNC: 25 MG/DL — HIGH (ref 7–23)
CALCIUM SERPL-MCNC: 9.5 MG/DL — SIGNIFICANT CHANGE UP (ref 8.4–10.5)
CHLORIDE SERPL-SCNC: 105 MMOL/L — SIGNIFICANT CHANGE UP (ref 96–108)
CO2 SERPL-SCNC: 25 MMOL/L — SIGNIFICANT CHANGE UP (ref 22–31)
CREAT SERPL-MCNC: 0.62 MG/DL — SIGNIFICANT CHANGE UP (ref 0.5–1.3)
GLUCOSE SERPL-MCNC: 154 MG/DL — HIGH (ref 70–99)
HCT VFR BLD CALC: 34 % — LOW (ref 34.5–45)
HGB BLD-MCNC: 11.2 G/DL — LOW (ref 11.5–15.5)
MCHC RBC-ENTMCNC: 30.8 PG — SIGNIFICANT CHANGE UP (ref 27–34)
MCHC RBC-ENTMCNC: 32.9 GM/DL — SIGNIFICANT CHANGE UP (ref 32–36)
MCV RBC AUTO: 93.4 FL — SIGNIFICANT CHANGE UP (ref 80–100)
NRBC # BLD: 0 /100 WBCS — SIGNIFICANT CHANGE UP (ref 0–0)
PLATELET # BLD AUTO: 204 K/UL — SIGNIFICANT CHANGE UP (ref 150–400)
POTASSIUM SERPL-MCNC: 4.1 MMOL/L — SIGNIFICANT CHANGE UP (ref 3.5–5.3)
POTASSIUM SERPL-SCNC: 4.1 MMOL/L — SIGNIFICANT CHANGE UP (ref 3.5–5.3)
RBC # BLD: 3.64 M/UL — LOW (ref 3.8–5.2)
RBC # FLD: 13.8 % — SIGNIFICANT CHANGE UP (ref 10.3–14.5)
SODIUM SERPL-SCNC: 140 MMOL/L — SIGNIFICANT CHANGE UP (ref 135–145)
WBC # BLD: 11.75 K/UL — HIGH (ref 3.8–10.5)
WBC # FLD AUTO: 11.75 K/UL — HIGH (ref 3.8–10.5)

## 2021-08-24 RX ORDER — BENZOCAINE AND MENTHOL 5; 1 G/100ML; G/100ML
1 LIQUID ORAL
Refills: 0 | Status: DISCONTINUED | OUTPATIENT
Start: 2021-08-24 | End: 2021-08-26

## 2021-08-24 RX ORDER — ACETAMINOPHEN 500 MG
3 TABLET ORAL
Qty: 0 | Refills: 0 | DISCHARGE
Start: 2021-08-24

## 2021-08-24 RX ORDER — RIVAROXABAN 15 MG-20MG
1 KIT ORAL
Qty: 0 | Refills: 0 | DISCHARGE
Start: 2021-08-24

## 2021-08-24 RX ORDER — TRAMADOL HYDROCHLORIDE 50 MG/1
0.5 TABLET ORAL
Qty: 0 | Refills: 0 | DISCHARGE
Start: 2021-08-24

## 2021-08-24 RX ORDER — ROSUVASTATIN CALCIUM 5 MG/1
20 TABLET ORAL AT BEDTIME
Refills: 0 | Status: DISCONTINUED | OUTPATIENT
Start: 2021-08-24 | End: 2021-08-26

## 2021-08-24 RX ADMIN — Medication 30 MILLILITER(S): at 17:08

## 2021-08-24 RX ADMIN — Medication 975 MILLIGRAM(S): at 23:10

## 2021-08-24 RX ADMIN — Medication 1 MILLIGRAM(S): at 11:17

## 2021-08-24 RX ADMIN — Medication 975 MILLIGRAM(S): at 22:40

## 2021-08-24 RX ADMIN — Medication 975 MILLIGRAM(S): at 13:30

## 2021-08-24 RX ADMIN — BENZOCAINE AND MENTHOL 1 LOZENGE: 5; 1 LIQUID ORAL at 22:43

## 2021-08-24 RX ADMIN — Medication 975 MILLIGRAM(S): at 05:21

## 2021-08-24 RX ADMIN — ROSUVASTATIN CALCIUM 20 MILLIGRAM(S): 5 TABLET ORAL at 22:43

## 2021-08-24 RX ADMIN — Medication 975 MILLIGRAM(S): at 14:00

## 2021-08-24 RX ADMIN — RIVAROXABAN 10 MILLIGRAM(S): KIT at 11:17

## 2021-08-24 NOTE — PROGRESS NOTE ADULT - SUBJECTIVE AND OBJECTIVE BOX
Ortho Progress Note    S: Patient seen and examined. No acute events overnight. Pain well controlled with current regimen. Denies lightheadedness/dizziness, CP/SOB. Tolerating diet.       O:  Physical Exam:  Gen: Laying in bed, NAD, alert and oriented.   Resp: Unlabored breathing  Ext: EHL/FHL/TA/Sol intact          + SILT DP/SP/GALEANO/Sa/Tib          +DP, extremity WWP  dressing c/d/i     Vital Signs Last 24 Hrs  T(C): 36.8 (24 Aug 2021 05:04), Max: 37.1 (23 Aug 2021 12:22)  T(F): 98.3 (24 Aug 2021 05:04), Max: 98.8 (23 Aug 2021 16:39)  HR: 75 (24 Aug 2021 05:04) (72 - 84)  BP: 167/78 (24 Aug 2021 05:04) (135/64 - 179/73)  BP(mean): --  RR: 18 (24 Aug 2021 05:04) (16 - 18)  SpO2: 96% (24 Aug 2021 05:04) (92% - 96%)                          11.9   14.45 )-----------( 216      ( 23 Aug 2021 07:07 )             36.3                         12.7   17.33 )-----------( 229      ( 22 Aug 2021 16:59 )             38.8       08-23    142  |  106  |  16  ----------------------------<  144<H>  4.0   |  22  |  0.70

## 2021-08-24 NOTE — DISCHARGE NOTE PROVIDER - CARE PROVIDER_API CALL
Rachid Lagos  ORTHOPAEDIC SURGERY  [address] [place] [address], [address]  [place] [address]  Phone: [phone]  Fax: [phone]  Follow Up Time:

## 2021-08-24 NOTE — DISCHARGE NOTE PROVIDER - NSDCMRMEDTOKEN_GEN_ALL_CORE_FT
acetaminophen 325 mg oral tablet: Take 3 tabs oral every 8 hours x 5 days, then as needed for mild pain, temp &gt;100.4F   rivaroxaban 10 mg oral tablet: 1 tab(s) orally once a day x 6 weeks total for dvt prevention  rosuvastatin 5 mg oral tablet: 1 tab(s) orally once a day  traMADol 50 mg oral tablet: 0.5-1 tab(s) orally every 4-6 hours, As needed, Moderate to Severe pain   acetaminophen 325 mg oral tablet: Take 3 tabs oral every 8 hours x 5 days, then as needed for mild pain, temp &gt;100.4F   enalapril 5 mg oral tablet: 1 tab(s) orally once a day  Multiple Vitamins oral tablet: 1 tab(s) orally once a day  Protonix 40 mg oral delayed release tablet: 1 tab(s) orally once a day  rivaroxaban 10 mg oral tablet: 1 tab(s) orally once a day x 6 weeks total for dvt prevention  rosuvastatin 5 mg oral tablet: 1 tab(s) orally once a day  traMADol 50 mg oral tablet: 0.5-1 tab(s) orally every 4-6 hours, As needed, Moderate to Severe pain

## 2021-08-24 NOTE — DISCHARGE NOTE PROVIDER - HOSPITAL COURSE
Reason for Admission: L intertrochanteric hip fracture  History of Present Illness:   89y Female PMH HLD presents s/p mechanical fall c/o severe L hip pain and inability to ambulate. States she was dancing at a wedding yesterday evening when she lost her balance, falling on her left hip. Patient denies headstrike or LOC. Patient denies radiation of pain. Patient denies numbness/tingling/burning in the LLE. No other bone/joint complaints. Patient is a community ambulator at baseline without assistive devices.    This is a 89 year old Female admitted to [facility] on 8/22/21 after a mechanical fall.  patient found to have L hip fracture.  Patient evaluated and cleared by Medicine for operative procedure.  On 8/22, patient underwent an uncomplicated L hip IM nail.  Evaluated and treated by PT, recommended for Subacute Rehab.  Remain of hospital stay unremarkable, and patient discharged to Subacute Rehab when bed available. Reason for Admission: L intertrochanteric hip fracture  History of Present Illness:   89y Female PMH HLD presents s/p mechanical fall c/o severe L hip pain and inability to ambulate. States she was dancing at a wedding yesterday evening when she lost her balance, falling on her left hip. Patient denies headstrike or LOC. Patient denies radiation of pain. Patient denies numbness/tingling/burning in the LLE. No other bone/joint complaints. Patient is a community ambulator at baseline without assistive devices.    This is a 89 year old Female admitted to [facility] on 8/22/21 after a mechanical fall.  patient found to have L hip fracture.  Patient evaluated and cleared by Medicine for operative procedure.  On 8/22, patient underwent an uncomplicated L hip IM nail.  Evaluated and treated by PT, recommended for Subacute Rehab. Patient with mild HTN started on medication need follow up with medical MD as out patient. Remain of hospital stay unremarkable, and patient discharged to Subacute Rehab when bed available.

## 2021-08-24 NOTE — PROGRESS NOTE ADULT - SUBJECTIVE AND OBJECTIVE BOX
89 y/o F with hx of HTN, HLD, not on blood thinners, presenting to the ED s/p fall c/o left thigh pain. Patient was dancing when she tripped over her feet and landed on her left thigh. She was unable to get off the floor or walk after the injury. Currently reports left thigh pain. No chest pain, shortness of breath, nausea, vomiting. No head injury or LOC. Patient was brought to [facility] where she was found to have a left hip fx. Patient is s/p an Open reduction and internal fixation of the left hip. Patient tolerated the procedure well and seen resting comfortably. Has started working with physical therapy. Patient states pain has been controlled       MEDICATIONS  (STANDING):  acetaminophen   Tablet .. 975 milliGRAM(s) Oral every 8 hours  atorvastatin 20 milliGRAM(s) Oral at bedtime  folic acid 1 milliGRAM(s) Oral daily  rivaroxaban 10 milliGRAM(s) Oral daily    MEDICATIONS  (PRN):  aluminum hydroxide/magnesium hydroxide/simethicone Suspension 30 milliLiter(s) Oral four times a day PRN Indigestion  benzocaine 15 mG/menthol 3.6 mG (Sugar-Free) Lozenge 1 Lozenge Oral every 2 hours PRN Sore Throat  bisacodyl Suppository 10 milliGRAM(s) Rectal daily PRN If no bowel movement  magnesium hydroxide Suspension 30 milliLiter(s) Oral daily PRN Constipation  oxyCODONE    IR 2.5 milliGRAM(s) Oral every 4 hours PRN Severe Pain (7 - 10)  traMADol 25 milliGRAM(s) Oral every 4 hours PRN Moderate Pain (4 - 6)          VITALS:   T(C): 37 (08-24-21 @ 12:48), Max: 37.1 (08-23-21 @ 16:39)  HR: 74 (08-24-21 @ 12:51) (72 - 84)  BP: 155/77 (08-24-21 @ 12:51) (135/64 - 179/73)  RR: 18 (08-24-21 @ 12:48) (18 - 18)  SpO2: 95% (08-24-21 @ 12:51) (93% - 96%)  Wt(kg): --    PHYSICAL EXAM:  GENERAL: NAD, well-groomed, well-developed  HEAD:  Atraumatic, Normocephalic  EYES: EOMI, PERRLA, conjunctiva and sclera clear  ENMT: No tonsillar erythema, exudates, or enlargement; Moist mucous membranes, Good dentition, No lesions  NECK: Supple, No JVD, Normal thyroid  NERVOUS SYSTEM:  Alert & Oriented X3, Good concentration; Motor Strength 5/5 B/L upper and lower extremities; DTRs 2+ intact and symmetric  CHEST/LUNG: Clear to percussion bilaterally; No rales, rhonchi, wheezing, or rubs  HEART: Regular rate and rhythm; No murmurs, rubs, or gallops  ABDOMEN: Soft, Nontender, Nondistended; Bowel sounds present  EXTREMITIES:  2+ Peripheral Pulses, No clubbing, cyanosis, or edema  LYMPH: No lymphadenopathy noted  SKIN: No rashes or lesions    LABS:        CBC Full  -  ( 24 Aug 2021 06:28 )  WBC Count : 11.75 K/uL  RBC Count : 3.64 M/uL  Hemoglobin : 11.2 g/dL  Hematocrit : 34.0 %  Platelet Count - Automated : 204 K/uL  Mean Cell Volume : 93.4 fl  Mean Cell Hemoglobin : 30.8 pg  Mean Cell Hemoglobin Concentration : 32.9 gm/dL  Auto Neutrophil # : x  Auto Lymphocyte # : x  Auto Monocyte # : x  Auto Eosinophil # : x  Auto Basophil # : x  Auto Neutrophil % : x  Auto Lymphocyte % : x  Auto Monocyte % : x  Auto Eosinophil % : x  Auto Basophil % : x    08-24    140  |  105  |  25<H>  ----------------------------<  154<H>  4.1   |  25  |  0.62    Ca    9.5      24 Aug 2021 06:18    TPro  x   /  Alb  3.9  /  TBili  x   /  DBili  x   /  AST  x   /  ALT  x   /  AlkPhos  x   08-23    LIVER FUNCTIONS - ( 23 Aug 2021 07:52 )  Alb: 3.9 g/dL / Pro: x     / ALK PHOS: x     / ALT: x     / AST: x     / GGT: x               CAPILLARY BLOOD GLUCOSE          RADIOLOGY & ADDITIONAL TESTS:

## 2021-08-24 NOTE — DISCHARGE NOTE PROVIDER - NSDCFUADDINST_GEN_ALL_CORE_FT
Please follow up with your doctor 14 days after your discharge from the hospital (call for appointment).  PT-weight bearing as tolerated.  Xarelto daily x 6 weeks total for dvt prevention.  Keep dressing clean and intact, have doctor remove staples/sutures post op day 14 (if applicable) and apply steristrips.  Please follow up with your PMD within 1 month for routine checkup.

## 2021-08-25 LAB
ANION GAP SERPL CALC-SCNC: 11 MMOL/L — SIGNIFICANT CHANGE UP (ref 5–17)
BUN SERPL-MCNC: 21 MG/DL — SIGNIFICANT CHANGE UP (ref 7–23)
CALCIUM SERPL-MCNC: 9.5 MG/DL — SIGNIFICANT CHANGE UP (ref 8.4–10.5)
CHLORIDE SERPL-SCNC: 106 MMOL/L — SIGNIFICANT CHANGE UP (ref 96–108)
CO2 SERPL-SCNC: 26 MMOL/L — SIGNIFICANT CHANGE UP (ref 22–31)
CREAT SERPL-MCNC: 0.58 MG/DL — SIGNIFICANT CHANGE UP (ref 0.5–1.3)
GLUCOSE SERPL-MCNC: 159 MG/DL — HIGH (ref 70–99)
POTASSIUM SERPL-MCNC: 4.2 MMOL/L — SIGNIFICANT CHANGE UP (ref 3.5–5.3)
POTASSIUM SERPL-SCNC: 4.2 MMOL/L — SIGNIFICANT CHANGE UP (ref 3.5–5.3)
SODIUM SERPL-SCNC: 143 MMOL/L — SIGNIFICANT CHANGE UP (ref 135–145)

## 2021-08-25 RX ORDER — HYDRALAZINE HCL 50 MG
10 TABLET ORAL ONCE
Refills: 0 | Status: COMPLETED | OUTPATIENT
Start: 2021-08-25 | End: 2021-08-25

## 2021-08-25 RX ADMIN — RIVAROXABAN 10 MILLIGRAM(S): KIT at 13:21

## 2021-08-25 RX ADMIN — Medication 975 MILLIGRAM(S): at 21:42

## 2021-08-25 RX ADMIN — Medication 10 MILLIGRAM(S): at 05:54

## 2021-08-25 RX ADMIN — Medication 975 MILLIGRAM(S): at 14:00

## 2021-08-25 RX ADMIN — Medication 10 MILLIGRAM(S): at 10:36

## 2021-08-25 RX ADMIN — ROSUVASTATIN CALCIUM 20 MILLIGRAM(S): 5 TABLET ORAL at 19:56

## 2021-08-25 RX ADMIN — Medication 1 MILLIGRAM(S): at 13:21

## 2021-08-25 RX ADMIN — Medication 975 MILLIGRAM(S): at 22:12

## 2021-08-25 RX ADMIN — Medication 975 MILLIGRAM(S): at 13:21

## 2021-08-25 RX ADMIN — Medication 2.5 MILLIGRAM(S): at 13:21

## 2021-08-25 NOTE — PROVIDER CONTACT NOTE (OTHER) - ASSESSMENT
pt AOx4, sleeping in bed, pt's BP is elevated (186/75)  pt denies pain, headache, chest pain, and dizziness. Pt stated to RN "I take holistic vitamin at home called hyperbalance that helps my BP". Pt not regularly on BP medication.
pt AOx4, VSS, and received from ED. pt's BP is elevated (189/85) and pt reports to RN "I take holistic vitamin at home called hyperbalance." pt also c/o extreme 7/10 pain from L hip, pending oxycodone & IV tylenol.
Pt A&Ox4. pt has no other c/o pain, CP, SOB, headache @ this time. supplement given to PA to assess
pt AOx4, sleeping in bed, pt's BP is elevated (185/74)  pt denies pain, headache, chest pain, and dizziness. Pt remains asymptomatic, Pt stated to RN "I take holistic vitamin at home called hyperbalance that helps my BP". Pt not regularly on BP medication.

## 2021-08-25 NOTE — PROVIDER CONTACT NOTE (OTHER) - RECOMMENDATIONS
re assess BP @ a later time, possible medication intervention
MD Juárez made aware, possible one time dose of BP medication- Hydralazine?
MD Juárez made aware, possible one time dose of BP medication?

## 2021-08-25 NOTE — PROGRESS NOTE ADULT - SUBJECTIVE AND OBJECTIVE BOX
89 y/o F with hx of HTN, HLD, not on blood thinners, presenting to the ED s/p fall c/o left thigh pain. Patient was dancing when she tripped over her feet and landed on her left thigh. She was unable to get off the floor or walk after the injury. Currently reports left thigh pain. No chest pain, shortness of breath, nausea, vomiting. No head injury or LOC. Patient was brought to [facility] where she was found to have a left hip fx. Patient is s/p an Open reduction and internal fixation of the left hip. Patient tolerated the procedure well and seen resting comfortably. Has started working with physical therapy. Patient states pain has been controlled     MEDICATIONS  (STANDING):  acetaminophen   Tablet .. 975 milliGRAM(s) Oral every 8 hours  enalapril 2.5 milliGRAM(s) Oral daily  folic acid 1 milliGRAM(s) Oral daily  rivaroxaban 10 milliGRAM(s) Oral daily  rosuvastatin 20 milliGRAM(s) Oral at bedtime    MEDICATIONS  (PRN):  aluminum hydroxide/magnesium hydroxide/simethicone Suspension 30 milliLiter(s) Oral four times a day PRN Indigestion  benzocaine 15 mG/menthol 3.6 mG (Sugar-Free) Lozenge 1 Lozenge Oral every 2 hours PRN Sore Throat  benzocaine 15 mG/menthol 3.6 mG (Sugar-Free) Lozenge 1 Lozenge Oral two times a day PRN Sore Throat  bisacodyl Suppository 10 milliGRAM(s) Rectal daily PRN If no bowel movement  magnesium hydroxide Suspension 30 milliLiter(s) Oral daily PRN Constipation  oxyCODONE    IR 2.5 milliGRAM(s) Oral every 4 hours PRN Severe Pain (7 - 10)  traMADol 25 milliGRAM(s) Oral every 4 hours PRN Moderate Pain (4 - 6)          VITALS:   T(C): 37.2 (08-25-21 @ 12:48), Max: 37.2 (08-25-21 @ 12:48)  HR: 96 (08-25-21 @ 12:48) (71 - 96)  BP: 161/77 (08-25-21 @ 12:48) (147/83 - 186/75)  RR: 18 (08-25-21 @ 12:48) (18 - 18)  SpO2: 97% (08-25-21 @ 12:48) (94% - 98%)  Wt(kg): --    PHYSICAL EXAM:  GENERAL: NAD, well-groomed, well-developed  HEAD:  Atraumatic, Normocephalic  EYES: EOMI, PERRLA, conjunctiva and sclera clear  ENMT: No tonsillar erythema, exudates, or enlargement; Moist mucous membranes, Good dentition, No lesions  NECK: Supple, No JVD, Normal thyroid  NERVOUS SYSTEM:  Alert & Oriented X3, Good concentration; Motor Strength 5/5 B/L upper and lower extremities; DTRs 2+ intact and symmetric  CHEST/LUNG: Clear to percussion bilaterally; No rales, rhonchi, wheezing, or rubs  HEART: Regular rate and rhythm; No murmurs, rubs, or gallops  ABDOMEN: Soft, Nontender, Nondistended; Bowel sounds present  EXTREMITIES:  2+ Peripheral Pulses, No clubbing, cyanosis, or edema  LYMPH: No lymphadenopathy noted  SKIN: No rashes or lesions      LABS:        CBC Full  -  ( 24 Aug 2021 06:28 )  WBC Count : 11.75 K/uL  RBC Count : 3.64 M/uL  Hemoglobin : 11.2 g/dL  Hematocrit : 34.0 %  Platelet Count - Automated : 204 K/uL  Mean Cell Volume : 93.4 fl  Mean Cell Hemoglobin : 30.8 pg  Mean Cell Hemoglobin Concentration : 32.9 gm/dL  Auto Neutrophil # : x  Auto Lymphocyte # : x  Auto Monocyte # : x  Auto Eosinophil # : x  Auto Basophil # : x  Auto Neutrophil % : x  Auto Lymphocyte % : x  Auto Monocyte % : x  Auto Eosinophil % : x  Auto Basophil % : x    08-25    143  |  106  |  21  ----------------------------<  159<H>  4.2   |  26  |  0.58    Ca    9.5      25 Aug 2021 07:13            CAPILLARY BLOOD GLUCOSE          RADIOLOGY & ADDITIONAL TESTS:

## 2021-08-25 NOTE — PROGRESS NOTE ADULT - SUBJECTIVE AND OBJECTIVE BOX
Ortho Progress Note    S: Patient seen and examined. No acute events overnight. Pain well controlled with current regimen. Denies lightheadedness/dizziness, CP/SOB. Tolerating diet.   Patient denies headache or blurry vision      O:  Physical Exam:  Gen: Laying in bed, NAD, alert and oriented.   Resp: Unlabored breathing  Ext: EHL/FHL/TA/Sol intact          + SILT DP/SP/GALEANO/Sa/Tib          +DP, extremity WWP  dressing c/d/i    Vital Signs Last 24 Hrs  T(C): 36.8 (25 Aug 2021 04:20), Max: 37 (24 Aug 2021 12:48)  T(F): 98.3 (25 Aug 2021 04:20), Max: 98.6 (24 Aug 2021 12:48)  HR: 77 (25 Aug 2021 04:20) (73 - 88)  BP: 185/74 (25 Aug 2021 04:20) (149/75 - 186/75)  BP(mean): --  RR: 18 (25 Aug 2021 04:20) (18 - 18)  SpO2: 96% (25 Aug 2021 04:20) (94% - 96%)                          11.2   11.75 )-----------( 204      ( 24 Aug 2021 06:28 )             34.0                         11.9   14.45 )-----------( 216      ( 23 Aug 2021 07:07 )             36.3       08-24    140  |  105  |  25<H>  ----------------------------<  154<H>  4.1   |  25  |  0.62

## 2021-08-25 NOTE — PROVIDER CONTACT NOTE (OTHER) - ACTION/TREATMENT ORDERED:
Marin Leary MD notified & aware. No interventions at this time & recheck VS, will cont to monitor.
as per PAM Liang, repeat BP @ later time. Notify provider if BP remains elevated
As per MD Juárez, no intervention ordered @this time. MD to discuss w/ Medicine team today regarding BP meds, Safety maintained.
As per MD Juárez, no intervention ordered @this time. RN to check BP in 2 hours and notify provider if BP remains elevated. Safety maintained.

## 2021-08-25 NOTE — PROVIDER CONTACT NOTE (OTHER) - BACKGROUND
8/22: ED s/p fall, +L hip fx
pt IM nail s/p fall @ home

## 2021-08-26 ENCOUNTER — TRANSCRIPTION ENCOUNTER (OUTPATIENT)
Age: 86
End: 2021-08-26

## 2021-08-26 VITALS — HEART RATE: 94 BPM | SYSTOLIC BLOOD PRESSURE: 149 MMHG | DIASTOLIC BLOOD PRESSURE: 72 MMHG | OXYGEN SATURATION: 95 %

## 2021-08-26 LAB — SARS-COV-2 RNA SPEC QL NAA+PROBE: SIGNIFICANT CHANGE UP

## 2021-08-26 PROCEDURE — 86850 RBC ANTIBODY SCREEN: CPT

## 2021-08-26 PROCEDURE — 85610 PROTHROMBIN TIME: CPT

## 2021-08-26 PROCEDURE — 76000 FLUOROSCOPY <1 HR PHYS/QHP: CPT

## 2021-08-26 PROCEDURE — 85730 THROMBOPLASTIN TIME PARTIAL: CPT

## 2021-08-26 PROCEDURE — 71045 X-RAY EXAM CHEST 1 VIEW: CPT

## 2021-08-26 PROCEDURE — C1769: CPT

## 2021-08-26 PROCEDURE — 36415 COLL VENOUS BLD VENIPUNCTURE: CPT

## 2021-08-26 PROCEDURE — C9399: CPT

## 2021-08-26 PROCEDURE — 97530 THERAPEUTIC ACTIVITIES: CPT

## 2021-08-26 PROCEDURE — U0005: CPT

## 2021-08-26 PROCEDURE — 97116 GAIT TRAINING THERAPY: CPT

## 2021-08-26 PROCEDURE — 86769 SARS-COV-2 COVID-19 ANTIBODY: CPT

## 2021-08-26 PROCEDURE — 96376 TX/PRO/DX INJ SAME DRUG ADON: CPT

## 2021-08-26 PROCEDURE — 73502 X-RAY EXAM HIP UNI 2-3 VIEWS: CPT

## 2021-08-26 PROCEDURE — 93005 ELECTROCARDIOGRAM TRACING: CPT

## 2021-08-26 PROCEDURE — 80053 COMPREHEN METABOLIC PANEL: CPT

## 2021-08-26 PROCEDURE — 82306 VITAMIN D 25 HYDROXY: CPT

## 2021-08-26 PROCEDURE — 80048 BASIC METABOLIC PNL TOTAL CA: CPT

## 2021-08-26 PROCEDURE — 86900 BLOOD TYPING SEROLOGIC ABO: CPT

## 2021-08-26 PROCEDURE — 97165 OT EVAL LOW COMPLEX 30 MIN: CPT

## 2021-08-26 PROCEDURE — 73552 X-RAY EXAM OF FEMUR 2/>: CPT

## 2021-08-26 PROCEDURE — 85025 COMPLETE CBC W/AUTO DIFF WBC: CPT

## 2021-08-26 PROCEDURE — 97161 PT EVAL LOW COMPLEX 20 MIN: CPT

## 2021-08-26 PROCEDURE — 86901 BLOOD TYPING SEROLOGIC RH(D): CPT

## 2021-08-26 PROCEDURE — 96374 THER/PROPH/DIAG INJ IV PUSH: CPT

## 2021-08-26 PROCEDURE — 72170 X-RAY EXAM OF PELVIS: CPT

## 2021-08-26 PROCEDURE — U0003: CPT

## 2021-08-26 PROCEDURE — 73501 X-RAY EXAM HIP UNI 1 VIEW: CPT

## 2021-08-26 PROCEDURE — C1713: CPT

## 2021-08-26 PROCEDURE — 82040 ASSAY OF SERUM ALBUMIN: CPT

## 2021-08-26 PROCEDURE — 85027 COMPLETE CBC AUTOMATED: CPT

## 2021-08-26 PROCEDURE — 99285 EMERGENCY DEPT VISIT HI MDM: CPT | Mod: 25

## 2021-08-26 RX ORDER — PANTOPRAZOLE SODIUM 20 MG/1
40 TABLET, DELAYED RELEASE ORAL
Refills: 0 | Status: DISCONTINUED | OUTPATIENT
Start: 2021-08-26 | End: 2021-08-26

## 2021-08-26 RX ORDER — PANTOPRAZOLE SODIUM 20 MG/1
1 TABLET, DELAYED RELEASE ORAL
Qty: 0 | Refills: 0 | DISCHARGE

## 2021-08-26 RX ADMIN — RIVAROXABAN 10 MILLIGRAM(S): KIT at 11:45

## 2021-08-26 RX ADMIN — PANTOPRAZOLE SODIUM 40 MILLIGRAM(S): 20 TABLET, DELAYED RELEASE ORAL at 11:15

## 2021-08-26 RX ADMIN — Medication 975 MILLIGRAM(S): at 14:03

## 2021-08-26 RX ADMIN — Medication 1 MILLIGRAM(S): at 11:46

## 2021-08-26 RX ADMIN — Medication 5 MILLIGRAM(S): at 05:04

## 2021-08-26 RX ADMIN — Medication 1 TABLET(S): at 11:45

## 2021-08-26 RX ADMIN — Medication 975 MILLIGRAM(S): at 13:33

## 2021-08-26 NOTE — PROGRESS NOTE ADULT - PROBLEM SELECTOR PLAN 1
***See Above  Rui OROZCO  Orthopedics  B: [id number]  S: [phone]
patient is S/P an Open reduction and internal fixation of the left hip  tolerated the procedure well  physical therapy as tolerated  DVT and GI prophylaxis.  eventual DC to rehab
patient is S/P an Open reduction and internal fixation of the left hip  tolerated the procedure well  physical therapy as tolerated  DVT and GI prophylaxis.
patient is S/P an Open reduction and internal fixation of the left hip  tolerated the procedure well  physical therapy as tolerated  DVT and GI prophylaxis.  eventual DC to rehab
patient is S/P an Open reduction and internal fixation of the left hip  tolerated the procedure well  physical therapy as tolerated  DVT and GI prophylaxis.  eventual DC to rehab

## 2021-08-26 NOTE — PROGRESS NOTE ADULT - PROBLEM SELECTOR PLAN 2
Patient has been treating HTN with diet  will continue to monitor BP and adjust meds as needed  enalapril added for BP control  will titrate up the dose as neede  Low sodium diet.
Patient has been treating HTN with diet  will continue to monitor BP and adjust meds as needed  Low sodium diet.
Patient has been treating HTN with diet  will continue to monitor BP and adjust meds as needed  enalapril added for BP control  will titrate up the dose as neede  Low sodium diet.
Patient has been treating HTN with diet  will continue to monitor BP and adjust meds as needed  Low sodium diet.

## 2021-08-26 NOTE — PROGRESS NOTE ADULT - REASON FOR ADMISSION
L intertrochanteric hip fracture

## 2021-08-26 NOTE — PROGRESS NOTE ADULT - SUBJECTIVE AND OBJECTIVE BOX
Orthopaedic Surgery Progress Note    Subjective:   Patient seen and examined. No acute events overnight. Walking the halls with PT using the walker. Pain well controlled with current regimen. Denies lightheadedness/dizziness, CP/SOB. Tolerating diet. Patient denies headache or blurry vision    Objective:  T(C): 37.1 (08-26-21 @ 04:45), Max: 37.2 (08-25-21 @ 12:48)  HR: 74 (08-26-21 @ 04:45) (71 - 96)  BP: 159/77 (08-26-21 @ 04:45) (147/83 - 178/83)  RR: 18 (08-26-21 @ 04:45) (18 - 18)  SpO2: 96% (08-26-21 @ 04:45) (96% - 98%)  Wt(kg): --    08-24 @ 07:01  -  08-25 @ 07:00  --------------------------------------------------------  IN: 1220 mL / OUT: 1800 mL / NET: -580 mL    08-25 @ 07:01  -  08-26 @ 06:45  --------------------------------------------------------  IN: 1360 mL / OUT: 1300 mL / NET: 60 mL        Physical Exam:    Physical Exam:  Gen: Laying in bed, NAD, alert and oriented.   Resp: Unlabored breathing  Ext: EHL/FHL/TA/Sol intact          + SILT DP/SP/GALEANO/Sa/Tib          +DP, extremity WWP  dressing c/d/i, changed at bedside        08-25    143  |  106  |  21  ----------------------------<  159<H>  4.2   |  26  |  0.58    Ca    9.5      25 Aug 2021 07:13

## 2021-08-26 NOTE — PROGRESS NOTE ADULT - ASSESSMENT
89 yo woman presents after a trip and fall with a left hip fx. Patient is S/P an Open reduction and internal fixation of the left hip. Patient seen resting comfortably
Assessment: s/p L Hip Fx    Plan:   NPO  ck cbc  Bedrest  NPO  for OR today  Await Med clearance
89F sp L hip IMN    Neuro: Pain control  Resp: IS  GI: Regular diet, bowel reg  MSK: WBAT, PT/OT  Heme: DVT PPX  dispo: [facility]    Ortho [id number]
89F POD4 s/p L IMN for L IT fracture    Neuro: Pain control  Resp: IS  GI: Regular diet, bowel reg  MSK: WBAT, PT/OT/OOBb  Heme: DVT PPX  CV: continue enalipril 2.5mg qd  Dispo: [facility] planning
89F sp L IMN    Neuro: Pain control  Resp: IS  GI: Regular diet, bowel reg  MSK: WBAT, PT/OT  Heme: DVT PPX  CV: hypertension, given IV hydralizine x1, and started on enalipril 2.5mg qd    Ortho [id number]/[id number]
A/P: 89y/o F Stable POD 1 s/p L IMN    -F/u AM labs  -WBAT  -Pain control  -PT/OT  -Ppx Abx for 24 hours  -DVT PE ppx, restart Xarelto  -Incentive spirometry  -Dispo Planning
89 yo woman presents after a trip and fall with a left hip fx. Patient is S/P an Open reduction and internal fixation of the left hip. Patient seen resting comfortably
89 yo woman presents after a trip and fall with a left hip fx. Patient is S/P an Open reduction and internal fixation of the left hip. Patient seen resting comfortably
89 yo woman presents after a trip and fall with a left hip fx. Patient is S/P an Open reduction and internal fixation of the left hip. Patient seen resting comfortably

## 2021-08-26 NOTE — PROGRESS NOTE ADULT - PROVIDER SPECIALTY LIST ADULT
Orthopedics
Internal Medicine
Internal Medicine
Orthopedics
Internal Medicine
Internal Medicine

## 2021-08-26 NOTE — PROGRESS NOTE ADULT - PROBLEM SELECTOR PLAN 4
Pain meds as needed  follow for oversedation  GI regime to prevent constipation.

## 2021-08-26 NOTE — PROGRESS NOTE ADULT - SUBJECTIVE AND OBJECTIVE BOX
89 y/o F with hx of HTN, HLD, not on blood thinners, presenting to the ED s/p fall c/o left thigh pain. Patient was dancing when she tripped over her feet and landed on her left thigh. She was unable to get off the floor or walk after the injury. Currently reports left thigh pain. No chest pain, shortness of breath, nausea, vomiting. No head injury or LOC. Patient was brought to [facility] where she was found to have a left hip fx. Patient is s/p an Open reduction and internal fixation of the left hip. Patient tolerated the procedure well and seen resting comfortably. Has started working with physical therapy. Patient states pain has been controlled     MEDICATIONS  (STANDING):  acetaminophen   Tablet .. 975 milliGRAM(s) Oral every 8 hours  enalapril 2.5 milliGRAM(s) Oral daily  folic acid 1 milliGRAM(s) Oral daily  rivaroxaban 10 milliGRAM(s) Oral daily  rosuvastatin 20 milliGRAM(s) Oral at bedtime    MEDICATIONS  (PRN):  aluminum hydroxide/magnesium hydroxide/simethicone Suspension 30 milliLiter(s) Oral four times a day PRN Indigestion  benzocaine 15 mG/menthol 3.6 mG (Sugar-Free) Lozenge 1 Lozenge Oral every 2 hours PRN Sore Throat  benzocaine 15 mG/menthol 3.6 mG (Sugar-Free) Lozenge 1 Lozenge Oral two times a day PRN Sore Throat  bisacodyl Suppository 10 milliGRAM(s) Rectal daily PRN If no bowel movement  magnesium hydroxide Suspension 30 milliLiter(s) Oral daily PRN Constipation  oxyCODONE    IR 2.5 milliGRAM(s) Oral every 4 hours PRN Severe Pain (7 - 10)  traMADol 25 milliGRAM(s) Oral every 4 hours PRN Moderate Pain (4 - 6)          VITALS:   T(C): 37.2 (08-25-21 @ 12:48), Max: 37.2 (08-25-21 @ 12:48)  HR: 96 (08-25-21 @ 12:48) (71 - 96)  BP: 161/77 (08-25-21 @ 12:48) (147/83 - 186/75)  RR: 18 (08-25-21 @ 12:48) (18 - 18)  SpO2: 97% (08-25-21 @ 12:48) (94% - 98%)  Wt(kg): --    PHYSICAL EXAM:  GENERAL: NAD, well-groomed, well-developed  HEAD:  Atraumatic, Normocephalic  EYES: EOMI, PERRLA, conjunctiva and sclera clear  ENMT: No tonsillar erythema, exudates, or enlargement; Moist mucous membranes, Good dentition, No lesions  NECK: Supple, No JVD, Normal thyroid  NERVOUS SYSTEM:  Alert & Oriented X3, Good concentration; Motor Strength 5/5 B/L upper and lower extremities; DTRs 2+ intact and symmetric  CHEST/LUNG: Clear to percussion bilaterally; No rales, rhonchi, wheezing, or rubs  HEART: Regular rate and rhythm; No murmurs, rubs, or gallops  ABDOMEN: Soft, Nontender, Nondistended; Bowel sounds present  EXTREMITIES:  2+ Peripheral Pulses, No clubbing, cyanosis, or edema  LYMPH: No lymphadenopathy noted  SKIN: No rashes or lesions      LABS:        CBC Full  -  ( 24 Aug 2021 06:28 )  WBC Count : 11.75 K/uL  RBC Count : 3.64 M/uL  Hemoglobin : 11.2 g/dL  Hematocrit : 34.0 %  Platelet Count - Automated : 204 K/uL  Mean Cell Volume : 93.4 fl  Mean Cell Hemoglobin : 30.8 pg  Mean Cell Hemoglobin Concentration : 32.9 gm/dL  Auto Neutrophil # : x  Auto Lymphocyte # : x  Auto Monocyte # : x  Auto Eosinophil # : x  Auto Basophil # : x  Auto Neutrophil % : x  Auto Lymphocyte % : x  Auto Monocyte % : x  Auto Eosinophil % : x  Auto Basophil % : x    08-25    143  |  106  |  21  ----------------------------<  159<H>  4.2   |  26  |  0.58    Ca    9.5      25 Aug 2021 07:13            CAPILLARY BLOOD GLUCOSE          RADIOLOGY & ADDITIONAL TESTS:

## 2021-08-26 NOTE — PROGRESS NOTE ADULT - TIME BILLING
Discussed treatment plan with patient at bedside.
Patient DCed to rehab  continue current meds  PO as tolerated  activity as tolerated   follow up with Ortho  Patient aware of plan

## 2021-08-26 NOTE — DISCHARGE NOTE NURSING/CASE MANAGEMENT/SOCIAL WORK - PATIENT PORTAL LINK FT
You can access the FollowMyHealth Patient Portal offered by [facility] by registering at the following website: [email or web address]. By joining [employer]’s FollowMyHealth portal, you will also be able to view your health information using other applications (apps) compatible with our system.

## 2022-06-22 ENCOUNTER — APPOINTMENT (OUTPATIENT)
Dept: ORTHOPEDIC SURGERY | Facility: CLINIC | Age: 87
End: 2022-06-22

## 2022-06-23 PROBLEM — Z00.00 ENCOUNTER FOR PREVENTIVE HEALTH EXAMINATION: Status: ACTIVE | Noted: 2022-06-23

## 2022-06-29 ENCOUNTER — APPOINTMENT (OUTPATIENT)
Dept: ORTHOPEDIC SURGERY | Facility: CLINIC | Age: 87
End: 2022-06-29

## 2022-06-29 VITALS
SYSTOLIC BLOOD PRESSURE: 160 MMHG | DIASTOLIC BLOOD PRESSURE: 92 MMHG | BODY MASS INDEX: 31.07 KG/M2 | WEIGHT: 148 LBS | HEART RATE: 78 BPM | HEIGHT: 58 IN

## 2022-06-29 DIAGNOSIS — Z82.3 FAMILY HISTORY OF STROKE: ICD-10-CM

## 2022-06-29 DIAGNOSIS — M54.9 DORSALGIA, UNSPECIFIED: ICD-10-CM

## 2022-06-29 DIAGNOSIS — Z83.3 FAMILY HISTORY OF DIABETES MELLITUS: ICD-10-CM

## 2022-06-29 DIAGNOSIS — Z87.39 PERSONAL HISTORY OF OTHER DISEASES OF THE MUSCULOSKELETAL SYSTEM AND CONNECTIVE TISSUE: ICD-10-CM

## 2022-06-29 DIAGNOSIS — M51.37 OTHER INTERVERTEBRAL DISC DEGENERATION, LUMBOSACRAL REGION: ICD-10-CM

## 2022-06-29 DIAGNOSIS — Z82.49 FAMILY HISTORY OF ISCHEMIC HEART DISEASE AND OTHER DISEASES OF THE CIRCULATORY SYSTEM: ICD-10-CM

## 2022-06-29 DIAGNOSIS — Z86.79 PERSONAL HISTORY OF OTHER DISEASES OF THE CIRCULATORY SYSTEM: ICD-10-CM

## 2022-06-29 DIAGNOSIS — Z78.9 OTHER SPECIFIED HEALTH STATUS: ICD-10-CM

## 2022-06-29 PROCEDURE — 99204 OFFICE O/P NEW MOD 45 MIN: CPT

## 2022-06-29 PROCEDURE — 72170 X-RAY EXAM OF PELVIS: CPT

## 2022-06-29 PROCEDURE — 72110 X-RAY EXAM L-2 SPINE 4/>VWS: CPT

## 2022-06-29 RX ORDER — DOXYCYCLINE HYCLATE 100 MG/1
100 CAPSULE ORAL
Qty: 20 | Refills: 0 | Status: ACTIVE | COMMUNITY
Start: 2022-05-13

## 2022-06-29 RX ORDER — ROSUVASTATIN CALCIUM 5 MG/1
5 TABLET, FILM COATED ORAL
Qty: 90 | Refills: 0 | Status: ACTIVE | COMMUNITY
Start: 2021-06-23

## 2022-06-29 RX ORDER — ENALAPRIL MALEATE 5 MG/1
5 TABLET ORAL
Qty: 90 | Refills: 0 | Status: ACTIVE | COMMUNITY
Start: 2022-03-17

## 2022-06-29 RX ORDER — PROMETHAZINE HYDROCHLORIDE AND DEXTROMETHORPHAN HYDROBROMIDE ORAL SOLUTION 15; 6.25 MG/5ML; MG/5ML
6.25-15 SOLUTION ORAL
Qty: 175 | Refills: 0 | Status: ACTIVE | COMMUNITY
Start: 2022-05-13

## 2022-06-29 RX ORDER — BRIMONIDINE TARTRATE, TIMOLOL MALEATE 2; 5 MG/ML; MG/ML
0.2-0.5 SOLUTION/ DROPS OPHTHALMIC
Refills: 0 | Status: ACTIVE | COMMUNITY

## 2022-06-29 NOTE — DISCUSSION/SUMMARY
[Medication Risks Reviewed] : Medication risks reviewed [de-identified] : Patient presents primarily for right buttock pain.  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  Prescribed her physical therapy.  She is not interested in prescription medications.  If her symptoms persist or worsen we discussed the option of lumbar spine MRI.  She may use Tylenol as needed basis

## 2022-06-29 NOTE — PHYSICAL EXAM
[Antalgic] : antalgic [Cane] : ambulates with cane [LE] : Sensory: Intact in bilateral lower extremities [0] : left ankle jerk 0 [SLR] : negative straight leg raise [Plantar Reflex Right Only] : absent on the right [Plantar Reflex Left Only] : absent on the left [DTR Reflexes Clonus Of Right Ankle (___ Beats)] : absent on the right [DTR Reflexes Clonus Of Left Ankle (___ Beats)] : absent on the left [de-identified] : The pt is awake, alert and oriented to self, place and time, is comfortable and in no acute distress. Inspection of neck, back and lower extremities bilaterally reveals no rashes or ecchymotic lesions.  There is no obvious abnormal spinal curvature in the sagittal and coronal planes. There is no tenderness over the cervical, thoracic or lumbar spine, or the upper and lower extremities musculature. There is no sacroiliac tenderness. No greater trochanteric tenderness bilaterally. No atrophy or abnormal movements noted in the upper or lower extremities. There is no swelling noted in the upper or lower extremities bilaterally. No cervical lymphadenopathy noted anteriorly. No joint laxity noted in the upper and lower extremity joints bilaterally.\par Hip range of motion is degrees internal rotation 30° external rotation without pain. Full range of motion of the shoulders bilaterally with no significant pain\par There is no groin pain with hip internal rotation and a negative MELISSA test bilaterally.  [de-identified] : healed left incision\par flex to her knees, ext 20 degrees with pain\par right gluteal tenderness [de-identified] : 4 views lumbar spine demonstrate minimal left-sided lumbar curve.  Normal lumbar lordosis.  Calcification of abdominal aorta.  Degenerative changes loss of disc height endplate sclerosis at L5-S1.  Degeneration also at L4-5.  No acute fractures.  No significant dynamic instability between flexion-extension.\par \par AP pelvis demonstrates left hip intramedullary nail for femur fracture.  Partially visualized.  Mild degenerative changes right hip.  Diffuse osteopenia.

## 2022-06-29 NOTE — HISTORY OF PRESENT ILLNESS
[6] : a current pain level of 6/10 [Daily] : ~He/She~ states the symptoms seem to be occuring daily [Walking] : worsened by walking [Acetaminophen] : relieved by acetaminophen [de-identified] : Patient is here today for evaluation on her right buttock pain going on for the past week no known injury and not medically treated for this issue.\par  Patient fell 8/21/21 fractured her left hip and had surgery after falling while dancing [de-identified] : cane

## 2022-06-30 ENCOUNTER — NON-APPOINTMENT (OUTPATIENT)
Age: 87
End: 2022-06-30

## 2022-11-11 NOTE — PHYSICAL THERAPY INITIAL EVALUATION ADULT - PHYSICAL ASSIST/NONPHYSICAL ASSIST: GAIT, REHAB EVAL
1 person assist
[email or web address]

## 2023-02-16 ENCOUNTER — OFFICE (OUTPATIENT)
Dept: URBAN - METROPOLITAN AREA CLINIC 90 | Facility: CLINIC | Age: 88
Setting detail: OPHTHALMOLOGY
End: 2023-02-16
Payer: COMMERCIAL

## 2023-02-16 DIAGNOSIS — H34.8312: ICD-10-CM

## 2023-02-16 DIAGNOSIS — H18.423: ICD-10-CM

## 2023-02-16 DIAGNOSIS — H11.153: ICD-10-CM

## 2023-02-16 DIAGNOSIS — H40.013: ICD-10-CM

## 2023-02-16 DIAGNOSIS — H16.223: ICD-10-CM

## 2023-02-16 PROBLEM — H34.831: Status: ACTIVE | Noted: 2023-02-16

## 2023-02-16 PROCEDURE — 92083 EXTENDED VISUAL FIELD XM: CPT | Performed by: OPHTHALMOLOGY

## 2023-02-16 PROCEDURE — 92014 COMPRE OPH EXAM EST PT 1/>: CPT | Performed by: OPHTHALMOLOGY

## 2023-02-16 ASSESSMENT — AXIALLENGTH_DERIVED
OS_AL: 21.6792
OS_AL: 22.6682
OD_AL: 31.31
OS_AL: 21.6381
OS_AL: 22.6233
OD_AL: 29.53
OS_AL: 22.4897
OS_AL: 21.6381
OS_AL: 21.6381
OD_AL: 31.22
OS_AL: 22.4897

## 2023-02-16 ASSESSMENT — REFRACTION_CURRENTRX
OS_SPHERE: PLANO
OS_SPHERE: +2.50
OD_SPHERE: PLANO
OD_AXIS: 119
OD_OVR_VA: 20/
OD_VPRISM_DIRECTION: SV
OS_CYLINDER: SPH
OS_VPRISM_DIRECTION: SV
OD_SPHERE: +3.50
OS_SPHERE: +3.00
OS_OVR_VA: 20/
OD_OVR_VA: 20/
OS_OVR_VA: 20/
OS_CYLINDER: -0.25
OD_OVR_VA: 20/
OD_CYLINDER: SPH
OS_AXIS: 26
OD_SPHERE: +1.00
OD_CYLINDER: +0.75
OD_VPRISM_DIRECTION: SV
OS_OVR_VA: 20/
OS_VPRISM_DIRECTION: SV

## 2023-02-16 ASSESSMENT — REFRACTION_MANIFEST
OD_VA1: 20/20
OD_ADD: +3.00
OS_AXIS: 110
OS_SPHERE: -0.25
OS_CYLINDER: +0.25
OS_AXIS: 110
OS_VA1: 20/20
OS_SPHERE: -0.25
OS_VA2: 20/25(J1)
OS_SPHERE: +2.50
OS_ADD: +3.00
OD_VA2: 20/25(J1)
OS_AXIS: 110
OD_VA1: 20/20
OD_CYLINDER: +0.50
OD_SPHERE: +0.50
OS_SPHERE: +2.75
OS_CYLINDER: +0.50
OS_VA1: 20/20
OD_SPHERE: +3.25
OD_SPHERE: +3.50
OD_AXIS: 82
OU_VA: 20/20
OS_SPHERE: +0.50
OD_ADD: +3.00
OS_CYLINDER: -0.25
OD_CYLINDER: SPH
OD_CYLINDER: SPHERE
OD_CYLINDER: SPH
OD_VA1: 2020
OD_CYLINDER: SPH
OU_VA: 20/20
OD_ADD: +3.00
OS_AXIS: 110
OS_CYLINDER: +0.25
OS_ADD: +3.00
OS_AXIS: 133
OD_SPHERE: +0.50
OD_SPHERE: +1.25
OD_SPHERE: +3.50
OS_AXIS: 43
OS_CYLINDER: +0.25
OD_SPHERE: +3.50
OD_ADD: +3.00
OS_SPHERE: +2.75
OD_VA1: 20/25
OS_ADD: +3.00
OD_AXIS: 170
OS_ADD: +3.00
OS_AXIS: 070
OS_CYLINDER: +0.25
OS_SPHERE: +2.75
OS_VA1: 20/20
OD_CYLINDER: -0.75
OS_CYLINDER: +0.50
OS_VA1: 20/20
OD_CYLINDER: SPH

## 2023-02-16 ASSESSMENT — VISUAL ACUITY
OS_BCVA: 20/30+2
OD_BCVA: 20/25+2

## 2023-02-16 ASSESSMENT — KERATOMETRY
OD_K2POWER_DIOPTERS: 7.25
OD_AXISANGLE_DEGREES: 159
OS_K1POWER_DIOPTERS: 46.25
METHOD_AUTO_MANUAL: AUTO
OD_K1POWER_DIOPTERS: 46.50
OS_K2POWER_DIOPTERS: 46.25
OS_AXISANGLE_DEGREES: 090

## 2023-02-16 ASSESSMENT — SUPERFICIAL PUNCTATE KERATITIS (SPK)
OD_SPK: T
OS_SPK: 1+ 2+

## 2023-02-16 ASSESSMENT — SPHEQUIV_DERIVED
OS_SPHEQUIV: 2.75
OD_SPHEQUIV: 0.875
OS_SPHEQUIV: 0.375
OD_SPHEQUIV: 3.5
OS_SPHEQUIV: -0.125
OS_SPHEQUIV: 0
OD_SPHEQUIV: 0.75
OS_SPHEQUIV: 0.375
OS_SPHEQUIV: 2.875

## 2023-02-16 ASSESSMENT — REFRACTION_AUTOREFRACTION
OD_AXIS: 079
OS_CYLINDER: -0.25
OS_SPHERE: +0.50
OD_CYLINDER: -1.00
OS_AXIS: 177
OD_SPHERE: +1.25

## 2023-02-16 ASSESSMENT — CORNEAL DYSTROPHY - BAND KERATOPATHY
OD_BANDKERATOPATHY: 1+
OS_BANDKERATOPATHY: 1+

## 2023-02-16 ASSESSMENT — PACHYMETRY
OS_CT_CORRECTION: 3
OD_CT_UM: 493
OS_CT_UM: 503
OD_CT_CORRECTION: 4

## 2023-02-16 ASSESSMENT — TONOMETRY
OD_IOP_MMHG: 20
OS_IOP_MMHG: 21

## 2023-02-16 ASSESSMENT — LID POSITION - DERMATOCHALASIS
OD_DERMATOCHALASIS: RUL 2+
OS_DERMATOCHALASIS: LUL 2+

## 2023-09-19 ENCOUNTER — OFFICE (OUTPATIENT)
Dept: URBAN - METROPOLITAN AREA CLINIC 90 | Facility: CLINIC | Age: 88
Setting detail: OPHTHALMOLOGY
End: 2023-09-19
Payer: COMMERCIAL

## 2023-09-19 DIAGNOSIS — H40.013: ICD-10-CM

## 2023-09-19 DIAGNOSIS — H18.423: ICD-10-CM

## 2023-09-19 DIAGNOSIS — H34.8312: ICD-10-CM

## 2023-09-19 DIAGNOSIS — H16.223: ICD-10-CM

## 2023-09-19 DIAGNOSIS — H11.153: ICD-10-CM

## 2023-09-19 PROCEDURE — 92133 CPTRZD OPH DX IMG PST SGM ON: CPT | Performed by: OPHTHALMOLOGY

## 2023-09-19 PROCEDURE — 92014 COMPRE OPH EXAM EST PT 1/>: CPT | Performed by: OPHTHALMOLOGY

## 2023-09-19 ASSESSMENT — REFRACTION_MANIFEST
OS_SPHERE: -0.25
OS_VA1: 20/20
OS_VA2: 20/25(J1)
OS_SPHERE: +2.75
OD_SPHERE: +1.25
OS_VA1: 20/20
OS_SPHERE: +0.50
OD_AXIS: 170
OS_SPHERE: +2.50
OD_VA1: 20/25
OD_CYLINDER: SPH
OD_CYLINDER: SPHERE
OU_VA: 20/20
OD_ADD: +3.00
OD_AXIS: 82
OD_ADD: +3.00
OS_ADD: +3.00
OD_SPHERE: +0.50
OD_CYLINDER: SPH
OS_AXIS: 133
OS_VA1: 20/20
OD_VA1: 2020
OS_ADD: +3.00
OU_VA: 20/20
OS_CYLINDER: +0.25
OS_AXIS: 110
OD_CYLINDER: SPH
OD_SPHERE: +0.50
OD_VA2: 20/25(J1)
OS_CYLINDER: +0.25
OS_CYLINDER: +0.25
OS_VA1: 20/20
OS_CYLINDER: -0.25
OS_SPHERE: -0.25
OD_CYLINDER: SPH
OS_AXIS: 110
OS_ADD: +3.00
OD_SPHERE: +3.50
OD_SPHERE: +3.50
OS_AXIS: 43
OS_AXIS: 110
OS_SPHERE: +2.75
OD_ADD: +3.00
OD_VA1: 20/20
OD_VA1: 20/20
OD_CYLINDER: +0.50
OD_SPHERE: +3.50
OS_ADD: +3.00
OD_SPHERE: +3.25
OS_CYLINDER: +0.50
OS_AXIS: 070
OS_SPHERE: +2.75
OS_CYLINDER: +0.25
OS_AXIS: 110
OD_ADD: +3.00
OS_CYLINDER: +0.50
OD_CYLINDER: -0.75

## 2023-09-19 ASSESSMENT — REFRACTION_AUTOREFRACTION
OD_SPHERE: +1.25
OS_AXIS: 000
OS_SPHERE: +0.25
OS_CYLINDER: 0.00
OD_CYLINDER: -1.00
OD_AXIS: 086

## 2023-09-19 ASSESSMENT — REFRACTION_CURRENTRX
OD_SPHERE: +3.50
OD_AXIS: 180
OS_SPHERE: +2.50
OD_AXIS: 119
OS_CYLINDER: -0.25
OS_CYLINDER: SPH
OS_OVR_VA: 20/
OS_VPRISM_DIRECTION: SV
OD_CYLINDER: +0.75
OS_VPRISM_DIRECTION: SV
OS_OVR_VA: 20/
OD_OVR_VA: 20/
OS_SPHERE: +3.00
OD_CYLINDER: SPH
OD_VPRISM_DIRECTION: SV
OS_SPHERE: PLANO
OD_SPHERE: +3.50
OS_CYLINDER: -0.25
OD_SPHERE: PLANO
OS_AXIS: 012
OD_VPRISM_DIRECTION: SV
OS_OVR_VA: 20/
OS_AXIS: 26
OS_VPRISM_DIRECTION: SV
OD_CYLINDER: 0.00
OD_OVR_VA: 20/
OD_OVR_VA: 20/
OD_VPRISM_DIRECTION: SV
OS_SPHERE: +2.50
OD_SPHERE: +1.00

## 2023-09-19 ASSESSMENT — CORNEAL DYSTROPHY - BAND KERATOPATHY
OS_BANDKERATOPATHY: 1+
OD_BANDKERATOPATHY: 1+

## 2023-09-19 ASSESSMENT — SPHEQUIV_DERIVED
OD_SPHEQUIV: 0.875
OS_SPHEQUIV: 2.75
OS_SPHEQUIV: 2.875
OD_SPHEQUIV: 3.5
OS_SPHEQUIV: 0.25
OS_SPHEQUIV: 2.875
OS_SPHEQUIV: -0.125
OS_SPHEQUIV: 0.375
OS_SPHEQUIV: 0
OD_SPHEQUIV: 0.75
OS_SPHEQUIV: 2.875

## 2023-09-19 ASSESSMENT — SUPERFICIAL PUNCTATE KERATITIS (SPK)
OD_SPK: T
OS_SPK: 1+ 2+

## 2023-09-19 ASSESSMENT — KERATOMETRY
OS_K1POWER_DIOPTERS: 46.00
OD_K2POWER_DIOPTERS: 47.00
OD_K1POWER_DIOPTERS: 46.50
OS_AXISANGLE_DEGREES: 092
OD_AXISANGLE_DEGREES: 156
OS_K2POWER_DIOPTERS: 46.50
METHOD_AUTO_MANUAL: AUTO

## 2023-09-19 ASSESSMENT — AXIALLENGTH_DERIVED
OS_AL: 22.6233
OD_AL: 21.2846
OS_AL: 22.5341
OS_AL: 21.6792
OS_AL: 21.6381
OS_AL: 22.6682
OS_AL: 22.4897
OD_AL: 22.194
OD_AL: 22.151

## 2023-09-19 ASSESSMENT — PACHYMETRY
OD_CT_CORRECTION: 4
OS_CT_UM: 503
OS_CT_CORRECTION: 3
OD_CT_UM: 493

## 2023-09-19 ASSESSMENT — CONFRONTATIONAL VISUAL FIELD TEST (CVF)
OD_FINDINGS: FULL
OS_FINDINGS: FULL

## 2023-09-19 ASSESSMENT — VISUAL ACUITY
OS_BCVA: 20/25-2
OD_BCVA: 20/20

## 2023-09-19 ASSESSMENT — LID POSITION - DERMATOCHALASIS
OS_DERMATOCHALASIS: LUL 2+
OD_DERMATOCHALASIS: RUL 2+

## 2023-09-19 ASSESSMENT — TONOMETRY
OS_IOP_MMHG: 21
OD_IOP_MMHG: 21

## 2024-03-19 ENCOUNTER — OFFICE (OUTPATIENT)
Dept: URBAN - METROPOLITAN AREA CLINIC 90 | Facility: CLINIC | Age: 89
Setting detail: OPHTHALMOLOGY
End: 2024-03-19
Payer: COMMERCIAL

## 2024-03-19 DIAGNOSIS — H18.423: ICD-10-CM

## 2024-03-19 DIAGNOSIS — H16.223: ICD-10-CM

## 2024-03-19 DIAGNOSIS — H40.013: ICD-10-CM

## 2024-03-19 DIAGNOSIS — H11.153: ICD-10-CM

## 2024-03-19 PROCEDURE — 92083 EXTENDED VISUAL FIELD XM: CPT | Performed by: OPHTHALMOLOGY

## 2024-03-19 PROCEDURE — 92012 INTRM OPH EXAM EST PATIENT: CPT | Performed by: OPHTHALMOLOGY

## 2024-03-19 ASSESSMENT — REFRACTION_CURRENTRX
OD_OVR_VA: 20/
OD_OVR_VA: 20/
OD_SPHERE: +3.50
OD_VPRISM_DIRECTION: SV
OD_VPRISM_DIRECTION: SV
OD_CYLINDER: +0.75
OS_SPHERE: +2.50
OS_CYLINDER: -0.25
OD_VPRISM_DIRECTION: SV
OD_OVR_VA: 20/
OS_SPHERE: +3.00
OD_SPHERE: +1.00
OD_AXIS: 119
OS_SPHERE: +2.50
OS_OVR_VA: 20/
OS_SPHERE: PLANO
OS_VPRISM_DIRECTION: SV
OD_SPHERE: PLANO
OS_OVR_VA: 20/
OS_CYLINDER: SPH
OS_AXIS: 012
OS_CYLINDER: -0.25
OD_SPHERE: +3.50
OD_CYLINDER: 0.00
OS_OVR_VA: 20/
OD_CYLINDER: SPH
OS_AXIS: 26
OS_VPRISM_DIRECTION: SV
OS_VPRISM_DIRECTION: SV
OD_AXIS: 180

## 2024-03-19 ASSESSMENT — REFRACTION_MANIFEST
OD_CYLINDER: -0.75
OS_VA1: 20/20
OS_CYLINDER: -0.25
OD_SPHERE: +3.50
OS_AXIS: 110
OD_VA1: 20/25
OD_ADD: +3.00
OS_CYLINDER: +0.50
OS_AXIS: 133
OS_SPHERE: +2.75
OD_VA2: 20/25(J1)
OD_SPHERE: +3.50
OD_VA1: 2020
OS_CYLINDER: +0.25
OD_CYLINDER: SPHERE
OS_ADD: +3.00
OS_VA2: 20/25(J1)
OD_CYLINDER: +0.50
OS_AXIS: 110
OS_CYLINDER: +0.25
OS_CYLINDER: +0.50
OS_ADD: +3.00
OD_CYLINDER: SPH
OU_VA: 20/20
OS_ADD: +3.00
OS_SPHERE: -0.25
OD_ADD: +3.00
OS_VA1: 20/20
OS_CYLINDER: +0.25
OS_CYLINDER: +0.25
OD_SPHERE: +3.50
OD_SPHERE: +0.50
OS_AXIS: 110
OS_VA1: 20/20
OD_AXIS: 170
OS_ADD: +3.00
OD_SPHERE: +3.25
OD_CYLINDER: SPH
OD_AXIS: 82
OS_VA1: 20/20
OS_AXIS: 43
OD_SPHERE: +1.25
OD_ADD: +3.00
OD_VA1: 20/20
OU_VA: 20/20
OS_SPHERE: +2.75
OD_CYLINDER: SPH
OD_CYLINDER: SPH
OS_SPHERE: +0.50
OS_SPHERE: +2.75
OS_SPHERE: -0.25
OD_ADD: +3.00
OS_AXIS: 110
OS_SPHERE: +2.50
OD_SPHERE: +0.50
OD_VA1: 20/20
OS_AXIS: 070

## 2024-03-19 ASSESSMENT — SPHEQUIV_DERIVED
OS_SPHEQUIV: 0.375
OD_SPHEQUIV: 0.875
OS_SPHEQUIV: -0.125
OS_SPHEQUIV: 2.875
OD_SPHEQUIV: 3.5
OS_SPHEQUIV: 2.875
OS_SPHEQUIV: 0
OS_SPHEQUIV: 2.875
OS_SPHEQUIV: 2.75

## 2024-07-23 ENCOUNTER — OFFICE (OUTPATIENT)
Dept: URBAN - METROPOLITAN AREA CLINIC 90 | Facility: CLINIC | Age: 89
Setting detail: OPHTHALMOLOGY
End: 2024-07-23
Payer: COMMERCIAL

## 2024-07-23 ENCOUNTER — RX ONLY (RX ONLY)
Age: 89
End: 2024-07-23

## 2024-07-23 DIAGNOSIS — H16.223: ICD-10-CM

## 2024-07-23 DIAGNOSIS — H18.423: ICD-10-CM

## 2024-07-23 DIAGNOSIS — H40.013: ICD-10-CM

## 2024-07-23 DIAGNOSIS — H11.153: ICD-10-CM

## 2024-07-23 PROCEDURE — 92014 COMPRE OPH EXAM EST PT 1/>: CPT | Performed by: OPHTHALMOLOGY

## 2024-07-23 PROCEDURE — 92133 CPTRZD OPH DX IMG PST SGM ON: CPT | Performed by: OPHTHALMOLOGY

## 2024-07-23 ASSESSMENT — CONFRONTATIONAL VISUAL FIELD TEST (CVF)
OD_FINDINGS: FULL
OS_FINDINGS: FULL

## 2024-07-23 ASSESSMENT — LID POSITION - DERMATOCHALASIS
OD_DERMATOCHALASIS: RUL 2+
OS_DERMATOCHALASIS: LUL 2+

## 2024-09-03 ENCOUNTER — RX ONLY (RX ONLY)
Age: 89
End: 2024-09-03

## 2024-09-03 ENCOUNTER — OFFICE (OUTPATIENT)
Dept: URBAN - METROPOLITAN AREA CLINIC 90 | Facility: CLINIC | Age: 89
Setting detail: OPHTHALMOLOGY
End: 2024-09-03
Payer: COMMERCIAL

## 2024-09-03 DIAGNOSIS — H18.423: ICD-10-CM

## 2024-09-03 DIAGNOSIS — H40.013: ICD-10-CM

## 2024-09-03 DIAGNOSIS — H16.223: ICD-10-CM

## 2024-09-03 PROCEDURE — 92012 INTRM OPH EXAM EST PATIENT: CPT | Performed by: OPHTHALMOLOGY

## 2024-09-03 ASSESSMENT — CONFRONTATIONAL VISUAL FIELD TEST (CVF)
OS_FINDINGS: FULL
OD_FINDINGS: FULL

## 2024-09-03 ASSESSMENT — LID POSITION - DERMATOCHALASIS
OD_DERMATOCHALASIS: RUL 2+
OS_DERMATOCHALASIS: LUL 2+

## 2024-10-17 ENCOUNTER — RX ONLY (RX ONLY)
Age: 89
End: 2024-10-17

## 2024-10-17 ENCOUNTER — OFFICE (OUTPATIENT)
Age: 89
Setting detail: OPHTHALMOLOGY
End: 2024-10-17
Payer: COMMERCIAL

## 2024-10-17 DIAGNOSIS — H40.013: ICD-10-CM

## 2024-10-17 PROCEDURE — 92012 INTRM OPH EXAM EST PATIENT: CPT | Performed by: OPHTHALMOLOGY

## 2024-10-17 ASSESSMENT — KERATOMETRY
OD_AXISANGLE_DEGREES: 155
OD_K1POWER_DIOPTERS: 46.25
METHOD_AUTO_MANUAL: AUTO
OS_K2POWER_DIOPTERS: 46.50
OD_K2POWER_DIOPTERS: 47.25
OS_K1POWER_DIOPTERS: 46.00
OS_AXISANGLE_DEGREES: 112

## 2024-10-17 ASSESSMENT — REFRACTION_CURRENTRX
OS_SPHERE: +2.50
OD_OVR_VA: 20/
OD_OVR_VA: 20/
OD_SPHERE: +3.50
OD_VPRISM_DIRECTION: SV
OS_CYLINDER: SPH
OD_SPHERE: +1.00
OS_SPHERE: +2.50
OD_CYLINDER: +0.75
OS_VPRISM_DIRECTION: SV
OS_SPHERE: +3.00
OS_OVR_VA: 20/
OS_CYLINDER: -0.25
OD_CYLINDER: SPH
OS_AXIS: 012
OS_OVR_VA: 20/
OS_AXIS: 26
OS_OVR_VA: 20/
OD_OVR_VA: 20/
OS_VPRISM_DIRECTION: SV
OS_VPRISM_DIRECTION: SV
OD_VPRISM_DIRECTION: SV
OD_AXIS: 180
OD_VPRISM_DIRECTION: SV
OD_AXIS: 119
OS_CYLINDER: -0.25
OS_SPHERE: PLANO
OD_SPHERE: +3.50
OD_SPHERE: PLANO
OD_CYLINDER: 0.00

## 2024-10-17 ASSESSMENT — REFRACTION_MANIFEST
OS_ADD: +3.00
OS_AXIS: 110
OS_AXIS: 070
OD_VA2: 20/25(J1)
OS_SPHERE: +2.75
OS_VA2: 20/25(J1)
OD_SPHERE: +3.50
OD_SPHERE: +3.50
OD_ADD: +3.00
OS_VA1: 20/20
OS_CYLINDER: +0.25
OS_CYLINDER: +0.25
OS_CYLINDER: +0.50
OD_SPHERE: +0.50
OD_VA1: 20/25
OS_VA1: 20/20
OD_CYLINDER: SPHERE
OD_CYLINDER: -0.75
OS_VA1: 20/20
OS_CYLINDER: +0.25
OS_CYLINDER: -0.25
OS_SPHERE: +2.75
OS_AXIS: 43
OS_AXIS: 110
OS_CYLINDER: +0.25
OD_CYLINDER: SPH
OD_VA1: 20/20
OS_SPHERE: +2.75
OD_SPHERE: +0.50
OD_ADD: +3.00
OS_SPHERE: -0.25
OS_CYLINDER: +0.50
OD_VA1: 20/20
OD_CYLINDER: +0.50
OD_AXIS: 82
OD_SPHERE: +3.25
OD_CYLINDER: SPH
OD_SPHERE: +1.25
OD_VA1: 2020
OS_AXIS: 110
OD_AXIS: 170
OS_SPHERE: -0.25
OS_VA1: 20/20
OS_ADD: +3.00
OD_ADD: +3.00
OD_SPHERE: +3.50
OU_VA: 20/20
OS_AXIS: 110
OD_CYLINDER: SPH
OS_SPHERE: +0.50
OS_AXIS: 133
OS_ADD: +3.00
OU_VA: 20/20
OD_CYLINDER: SPH
OS_SPHERE: +2.50
OS_ADD: +3.00
OD_ADD: +3.00

## 2024-10-17 ASSESSMENT — REFRACTION_AUTOREFRACTION
OD_SPHERE: +1.25
OS_CYLINDER: -0.25
OD_CYLINDER: -1.00
OS_AXIS: 032
OS_SPHERE: +0.50
OD_AXIS: 083

## 2024-10-17 ASSESSMENT — PACHYMETRY
OD_CT_UM: 493
OS_CT_CORRECTION: 3
OS_CT_UM: 503
OD_CT_CORRECTION: 4

## 2024-10-17 ASSESSMENT — VISUAL ACUITY
OD_BCVA: 20/20
OS_BCVA: 20/30+2

## 2024-10-17 ASSESSMENT — LID POSITION - DERMATOCHALASIS
OS_DERMATOCHALASIS: LUL 2+
OD_DERMATOCHALASIS: RUL 2+

## 2024-10-17 ASSESSMENT — CONFRONTATIONAL VISUAL FIELD TEST (CVF)
OD_FINDINGS: FULL
OS_FINDINGS: FULL

## 2024-10-17 ASSESSMENT — CORNEAL DYSTROPHY - BAND KERATOPATHY
OS_BANDKERATOPATHY: 1+
OD_BANDKERATOPATHY: 1+

## 2024-10-17 ASSESSMENT — SUPERFICIAL PUNCTATE KERATITIS (SPK)
OD_SPK: T
OS_SPK: 1+ 2+

## 2024-12-05 ENCOUNTER — RX ONLY (RX ONLY)
Age: 89
End: 2024-12-05

## 2024-12-05 ENCOUNTER — OFFICE (OUTPATIENT)
Facility: LOCATION | Age: 89
Setting detail: OPHTHALMOLOGY
End: 2024-12-05
Payer: COMMERCIAL

## 2024-12-05 DIAGNOSIS — H40.013: ICD-10-CM

## 2024-12-05 PROCEDURE — 92012 INTRM OPH EXAM EST PATIENT: CPT | Performed by: OPHTHALMOLOGY

## 2024-12-05 ASSESSMENT — REFRACTION_CURRENTRX
OS_SPHERE: +2.50
OD_VPRISM_DIRECTION: SV
OD_VPRISM_DIRECTION: SV
OS_OVR_VA: 20/
OD_VPRISM_DIRECTION: SV
OD_CYLINDER: +0.75
OD_OVR_VA: 20/
OS_SPHERE: +3.00
OS_CYLINDER: SPH
OD_CYLINDER: 0.00
OS_AXIS: 26
OD_SPHERE: +1.00
OD_AXIS: 119
OS_CYLINDER: -0.25
OS_SPHERE: PLANO
OS_CYLINDER: -0.25
OD_OVR_VA: 20/
OS_OVR_VA: 20/
OD_SPHERE: +3.50
OS_VPRISM_DIRECTION: SV
OS_OVR_VA: 20/
OS_VPRISM_DIRECTION: SV
OD_AXIS: 180
OD_OVR_VA: 20/
OD_SPHERE: PLANO
OS_SPHERE: +2.50
OS_VPRISM_DIRECTION: SV
OS_AXIS: 012
OD_CYLINDER: SPH
OD_SPHERE: +3.50

## 2024-12-05 ASSESSMENT — REFRACTION_MANIFEST
OD_CYLINDER: SPHERE
OD_SPHERE: +3.50
OS_SPHERE: +2.75
OD_VA1: 20/20
OD_AXIS: 170
OS_SPHERE: -0.25
OS_VA1: 20/20
OD_CYLINDER: SPH
OS_CYLINDER: +0.25
OD_SPHERE: +3.25
OU_VA: 20/20
OS_SPHERE: +2.75
OS_VA1: 20/20
OS_SPHERE: +0.50
OD_ADD: +3.00
OU_VA: 20/20
OD_VA2: 20/25(J1)
OS_SPHERE: +2.50
OD_CYLINDER: +0.50
OS_CYLINDER: +0.50
OS_ADD: +3.00
OS_AXIS: 43
OS_ADD: +3.00
OD_CYLINDER: SPH
OD_SPHERE: +0.50
OS_CYLINDER: +0.25
OD_SPHERE: +3.50
OS_CYLINDER: +0.25
OD_SPHERE: +3.50
OS_VA2: 20/25(J1)
OS_AXIS: 070
OS_AXIS: 110
OS_AXIS: 133
OS_SPHERE: +2.75
OS_CYLINDER: +0.50
OD_SPHERE: +0.50
OS_AXIS: 110
OD_AXIS: 82
OD_SPHERE: +1.25
OD_SPHERE: +3.50
OS_CYLINDER: +0.25
OD_CYLINDER: SPH
OS_CYLINDER: -0.25
OS_AXIS: 110
OD_ADD: +3.00
OS_SPHERE: +2.75
OD_VA1: 2020
OS_AXIS: 110
OD_CYLINDER: -0.75
OD_ADD: +3.00
OD_ADD: +3.00
OS_SPHERE: -0.25
OS_VA1: 20/20
OS_CYLINDER: +0.25
OD_CYLINDER: SPH
OS_AXIS: 110
OD_VA1: 20/25
OD_VA1: 20/20
OS_ADD: +3.00
OS_ADD: +3.00
OS_VA1: 20/20
OD_CYLINDER: SPH

## 2024-12-05 ASSESSMENT — KERATOMETRY
OS_K2POWER_DIOPTERS: 46.50
OD_AXISANGLE_DEGREES: 155
OD_K1POWER_DIOPTERS: 46.25
OD_K2POWER_DIOPTERS: 47.25
METHOD_AUTO_MANUAL: AUTO
OS_AXISANGLE_DEGREES: 112
OS_K1POWER_DIOPTERS: 46.00

## 2024-12-05 ASSESSMENT — PACHYMETRY
OS_CT_UM: 503
OD_CT_UM: 493
OD_CT_CORRECTION: 4
OS_CT_CORRECTION: 3

## 2024-12-05 ASSESSMENT — LID POSITION - DERMATOCHALASIS
OD_DERMATOCHALASIS: RUL 2+
OS_DERMATOCHALASIS: LUL 2+

## 2024-12-05 ASSESSMENT — VISUAL ACUITY
OD_BCVA: 20/20
OS_BCVA: 20/30+2

## 2024-12-05 ASSESSMENT — CONFRONTATIONAL VISUAL FIELD TEST (CVF)
OD_FINDINGS: FULL
OS_FINDINGS: FULL

## 2024-12-05 ASSESSMENT — CORNEAL DYSTROPHY - BAND KERATOPATHY
OD_BANDKERATOPATHY: 1+
OS_BANDKERATOPATHY: 1+

## 2024-12-05 ASSESSMENT — TONOMETRY: OS_IOP_MMHG: 19

## 2024-12-05 ASSESSMENT — REFRACTION_AUTOREFRACTION
OS_AXIS: 032
OS_SPHERE: +0.50
OS_CYLINDER: -0.25
OD_SPHERE: +1.25
OD_CYLINDER: -1.00
OD_AXIS: 083

## 2024-12-05 ASSESSMENT — SUPERFICIAL PUNCTATE KERATITIS (SPK)
OD_SPK: T
OS_SPK: 1+ 2+

## 2025-02-27 NOTE — ED ADULT NURSE NOTE - SUICIDE SCREENING QUESTION 2
02/27/25 1618   AVS Reviewed   AVS & discharge instructions reviewed with patient and/or representative? Yes   Reviewed instructions with Patient   Level of Understanding Questions answered     IV removed without complications.   
No

## 2025-05-21 ENCOUNTER — RX ONLY (RX ONLY)
Age: OVER 89
End: 2025-05-21

## 2025-05-21 ENCOUNTER — OFFICE (OUTPATIENT)
Facility: LOCATION | Age: OVER 89
Setting detail: OPHTHALMOLOGY
End: 2025-05-21
Payer: MEDICARE

## 2025-05-21 DIAGNOSIS — H11.153: ICD-10-CM

## 2025-05-21 DIAGNOSIS — H40.013: ICD-10-CM

## 2025-05-21 DIAGNOSIS — H18.423: ICD-10-CM

## 2025-05-21 DIAGNOSIS — H40.1412: ICD-10-CM

## 2025-05-21 DIAGNOSIS — H16.223: ICD-10-CM

## 2025-05-21 DIAGNOSIS — H34.8312: ICD-10-CM

## 2025-05-21 PROCEDURE — 92083 EXTENDED VISUAL FIELD XM: CPT | Performed by: OPHTHALMOLOGY

## 2025-05-21 PROCEDURE — 92014 COMPRE OPH EXAM EST PT 1/>: CPT | Performed by: OPHTHALMOLOGY

## 2025-05-21 PROCEDURE — 92133 CPTRZD OPH DX IMG PST SGM ON: CPT | Performed by: OPHTHALMOLOGY

## 2025-05-21 ASSESSMENT — REFRACTION_CURRENTRX
OS_OVR_VA: 20/
OS_VPRISM_DIRECTION: SV
OD_VPRISM_DIRECTION: SV
OS_VPRISM_DIRECTION: SV
OS_SPHERE: PLANO
OD_VPRISM_DIRECTION: SV
OS_AXIS: 012
OD_SPHERE: +1.00
OS_SPHERE: +2.50
OD_AXIS: 180
OD_SPHERE: PLANO
OS_VPRISM_DIRECTION: SV
OD_SPHERE: +3.50
OD_VPRISM_DIRECTION: SV
OS_CYLINDER: -0.25
OD_CYLINDER: SPH
OD_OVR_VA: 20/
OD_CYLINDER: 0.00
OD_SPHERE: +3.50
OS_OVR_VA: 20/
OD_OVR_VA: 20/
OS_SPHERE: +3.00
OD_CYLINDER: +0.75
OS_AXIS: 26
OS_CYLINDER: SPH
OD_AXIS: 119
OD_OVR_VA: 20/
OS_OVR_VA: 20/
OS_CYLINDER: -0.25
OS_SPHERE: +2.50

## 2025-05-21 ASSESSMENT — KERATOMETRY
OD_K2POWER_DIOPTERS: 47.25
OD_AXISANGLE_DEGREES: 155
OD_K1POWER_DIOPTERS: 46.50
OS_K2POWER_DIOPTERS: 46.75
OS_K1POWER_DIOPTERS: 46.00
OS_AXISANGLE_DEGREES: 101
METHOD_AUTO_MANUAL: AUTO

## 2025-05-21 ASSESSMENT — PACHYMETRY
OD_CT_UM: 493
OS_CT_UM: 503
OD_CT_CORRECTION: 4
OS_CT_CORRECTION: 3

## 2025-05-21 ASSESSMENT — REFRACTION_MANIFEST
OD_ADD: +3.00
OD_SPHERE: +3.50
OD_VA1: 2020
OD_VA2: 20/25(J1)
OS_ADD: +3.00
OS_AXIS: 070
OU_VA: 20/20
OD_CYLINDER: SPH
OD_SPHERE: +3.25
OD_SPHERE: +0.50
OD_AXIS: 82
OD_CYLINDER: SPH
OS_ADD: +3.00
OD_SPHERE: +3.50
OD_CYLINDER: SPH
OU_VA: 20/20
OD_VA1: 20/20
OS_SPHERE: +2.75
OS_AXIS: 43
OS_AXIS: 110
OD_VA1: 20/25
OD_VA1: 20/20
OS_CYLINDER: +0.25
OS_SPHERE: +2.75
OS_AXIS: 110
OS_CYLINDER: -0.25
OD_SPHERE: +3.50
OS_VA1: 20/20
OD_ADD: +3.00
OS_SPHERE: +2.75
OS_CYLINDER: +0.25
OS_AXIS: 110
OD_CYLINDER: SPHERE
OD_CYLINDER: -0.75
OS_SPHERE: +2.75
OS_CYLINDER: +0.50
OS_SPHERE: +0.50
OS_AXIS: 110
OD_CYLINDER: SPH
OS_VA1: 20/20
OS_SPHERE: -0.25
OD_CYLINDER: +0.50
OS_AXIS: 133
OS_CYLINDER: +0.25
OD_SPHERE: +1.25
OS_CYLINDER: +0.50
OS_VA1: 20/20
OS_CYLINDER: +0.25
OD_CYLINDER: SPH
OD_SPHERE: +3.50
OD_ADD: +3.00
OS_VA1: 20/20
OD_ADD: +3.00
OS_AXIS: 110
OS_VA2: 20/25(J1)
OD_AXIS: 170
OD_SPHERE: +0.50
OS_CYLINDER: +0.25
OS_SPHERE: -0.25
OS_SPHERE: +2.50

## 2025-05-21 ASSESSMENT — REFRACTION_AUTOREFRACTION
OD_CYLINDER: -1.25
OS_SPHERE: +0.25
OS_CYLINDER: -0.50
OD_SPHERE: +1.25
OS_AXIS: 050
OD_AXIS: 085

## 2025-05-21 ASSESSMENT — TONOMETRY
OS_IOP_MMHG: 21
OS_IOP_MMHG: 18

## 2025-05-21 ASSESSMENT — VISUAL ACUITY
OS_BCVA: 20/30+3
OD_BCVA: 20/20

## 2025-05-21 ASSESSMENT — CONFRONTATIONAL VISUAL FIELD TEST (CVF)
OD_FINDINGS: FULL
OS_FINDINGS: FULL

## 2025-05-21 ASSESSMENT — LID POSITION - DERMATOCHALASIS
OD_DERMATOCHALASIS: RUL 2+
OS_DERMATOCHALASIS: LUL 2+

## 2025-05-21 ASSESSMENT — SUPERFICIAL PUNCTATE KERATITIS (SPK)
OD_SPK: T
OS_SPK: 1+ 2+

## 2025-05-21 ASSESSMENT — CORNEAL DYSTROPHY - BAND KERATOPATHY
OD_BANDKERATOPATHY: 1+
OS_BANDKERATOPATHY: 1+